# Patient Record
Sex: MALE | Race: WHITE | NOT HISPANIC OR LATINO | Employment: OTHER | URBAN - METROPOLITAN AREA
[De-identification: names, ages, dates, MRNs, and addresses within clinical notes are randomized per-mention and may not be internally consistent; named-entity substitution may affect disease eponyms.]

---

## 2023-07-19 NOTE — PROGRESS NOTES
"Subjective   Patient ID: Dixon Pino is a 63 y.o. male who presents for New Patient Visit and Medicare Annual Wellness Visit Initial. His wife is with him today.     HPI   His last office visit was over 15 yrs ago. Patient has NKA and is not currently taking any daily medications.     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 He notes that he can see out of his right eye, but everything is distorted.     He mentions that from the knee down, on his left leg, \"everything is on fire.\" This occurs intermittently.     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 Rx in MED list prescribed by Dr. Patel.     Review of Systems  Except positives as noted in the CC & HPI      Constitutional: Denies fevers, chills, night sweats, fatigue, weight changes, change in appetite    Eyes: Denies blurry vision, double vision, distorted vision  ENT: Denies otalgia, trouble hearing, tinnitus, vertigo, nasal congestion, rhinorrhea, sore throat    Neck: Denies swelling, masses    Cardiovascular: Denies chest pain, palpitations, edema, orthopnea, syncope    Respiratory: Denies dyspnea, cough, wheezing, postural nocturnal dyspnea    Gastrointestinal: Denies abdominal pain, nausea, vomiting, diarrhea, constipation, melena, hematochezia    Genitourinary: Denies dysuria, hematuria, frequency, urgency    Musculoskeletal: Denies back pain, neck pain, arthralgias, myalgias    Integumentary: Denies skin lesions, rashes, masses    Neurological: Denies dizziness, headaches, confusion, limb weakness, paresthesias, syncope, convulsions    Psychiatric: Denies depression, anxiety, homicidal ideations, suicidal ideations, sleep disturbances    Endocrine: Denies polyphagia, polydipsia, polyuria, weakness, hair thinning, heat intolerance, cold intolerance, weight changes    Heme/Lymph: Denies easy bruising, easy bleeding, swollen glands    Objective   BP " "144/72 (BP Location: Right arm, Patient Position: Sitting)   Pulse 74   Temp 36.4 °C (97.6 °F) (Temporal)   Resp 16   Ht 1.676 m (5' 6\")   Wt 94.3 kg (208 lb)   SpO2 97%   BMI 33.57 kg/m²     Physical Exam  144/72 on recheck of BP in the right arm.     General Appearance - well-developed, well-nourished, 63 y.o., White male in no acute distress.     Skin - warm, pink and dry without rash or concerning lesions.     Mental Status - alert and oriented x 3. Normal mood and affect appropriate to mood.     Ears - TMs shiny and move well with insufflation. Ear canals contain mild cerumen bilaterally.     Neck - supple without lymphadenopathy. Carotid pulses are normal without bruits. Thyroid is normal in midline without nodules.     Chest - lungs are clear to auscultation without rales, rhonchi or wheezes.     Heart - regular, rate and rhythm without murmurs, rubs or gallops.    Abdomen - soft, obese, protuberant, nontender, nondistended. No masses, hepatomegaly or splenomegaly is noted. No rebound, rigidity or guarding is noted. Bowel sounds are normoactive. Diastasis recti noted.     Extremities - no cyanosis, clubbing or edema. Pedal pulses are 2+ normal at the dorsalis pedis and posterior pulses bilaterally.     Neurological - cranial nerves II through XII are grossly intact. Motor strength 5/5 at all fours.     Assessment/Plan   1. Healthcare maintenance  Comprehensive Metabolic Panel    Urinalysis with Reflex Microscopic      2. Encounter to establish care        3. Bilateral leg paresthesia  TSH with reflex to Free T4 if abnormal    Vitamin B12    Folate    Hemoglobin A1C    Follow Up In [facility] - PCP - Established      4. Acne rosacea  CBC and Auto Differential    Hemoglobin A1C    Follow Up In [facility] - PCP - Established      5. Class 1 obesity due to excess calories without serious comorbidity with body mass index (BMI) of 33.0 to 33.9 in adult        6. Lipid screening  Lipid " Panel      7. Screening PSA (prostate specific antigen)  Prostate Specific Antigen, Screen      8. Screening for malignant neoplasm of colon  Colonoscopy      9. Need for pneumococcal 20-valent conjugate vaccination  Pneumococcal conjugate vaccine, 20-valent, adult (PREVNAR 20)      Patient to continue current medications (with any exceptions as noted) and diet. Follow-up in 3 month(s) otherwise as needed.      Patient is to return for fasting CBC, CMP, lipid panel, PSA, B12, folate, TSH, A1c, UA labs at their convenience prior to his next appointment. Fasting is nothing to eat or drink except water or black coffee for 8-12 hours. Will call patient with results when available.     Patient was advised to limit their salt intake and to not add any extra salt to their food. Patient is to avoid pretzels, chips, lunch meats, canned soups, soda pop, ham, ugarte, hot dogs, etc.     Colonoscopy was ordered to screen for colorectal cancer.     Prevnar-20 vaccine(s) given in the office today.     Patient is to follow up with Dr. Patel as scheduled.     Consider EMG/NCV pending lab results.         Scribe Attestation  By signing my name below, I, Brooklyn Parnell, George   attest that this documentation has been prepared under the direction and in the presence of Ryan Valdez MD.

## 2023-07-20 ENCOUNTER — OFFICE VISIT (OUTPATIENT)
Dept: PRIMARY CARE | Facility: CLINIC | Age: 63
End: 2023-07-20
Payer: MEDICARE

## 2023-07-20 VITALS
HEIGHT: 66 IN | SYSTOLIC BLOOD PRESSURE: 144 MMHG | TEMPERATURE: 97.6 F | BODY MASS INDEX: 33.43 KG/M2 | HEART RATE: 74 BPM | DIASTOLIC BLOOD PRESSURE: 72 MMHG | WEIGHT: 208 LBS | OXYGEN SATURATION: 97 % | RESPIRATION RATE: 16 BRPM

## 2023-07-20 DIAGNOSIS — Z12.11 SCREENING FOR MALIGNANT NEOPLASM OF COLON: ICD-10-CM

## 2023-07-20 DIAGNOSIS — L71.9 ACNE ROSACEA: ICD-10-CM

## 2023-07-20 DIAGNOSIS — Z76.89 ENCOUNTER TO ESTABLISH CARE: ICD-10-CM

## 2023-07-20 DIAGNOSIS — Z12.5 SCREENING PSA (PROSTATE SPECIFIC ANTIGEN): ICD-10-CM

## 2023-07-20 DIAGNOSIS — Z23 NEED FOR PNEUMOCOCCAL 20-VALENT CONJUGATE VACCINATION: ICD-10-CM

## 2023-07-20 DIAGNOSIS — E66.09 CLASS 1 OBESITY DUE TO EXCESS CALORIES WITHOUT SERIOUS COMORBIDITY WITH BODY MASS INDEX (BMI) OF 33.0 TO 33.9 IN ADULT: ICD-10-CM

## 2023-07-20 DIAGNOSIS — R20.2 BILATERAL LEG PARESTHESIA: ICD-10-CM

## 2023-07-20 DIAGNOSIS — Z13.220 LIPID SCREENING: ICD-10-CM

## 2023-07-20 DIAGNOSIS — Z00.00 HEALTHCARE MAINTENANCE: Primary | ICD-10-CM

## 2023-07-20 PROBLEM — E66.811 CLASS 1 OBESITY DUE TO EXCESS CALORIES WITHOUT SERIOUS COMORBIDITY WITH BODY MASS INDEX (BMI) OF 33.0 TO 33.9 IN ADULT: Status: ACTIVE | Noted: 2023-07-20

## 2023-07-20 PROCEDURE — 1160F RVW MEDS BY RX/DR IN RCRD: CPT | Performed by: FAMILY MEDICINE

## 2023-07-20 PROCEDURE — 1159F MED LIST DOCD IN RCRD: CPT | Performed by: FAMILY MEDICINE

## 2023-07-20 PROCEDURE — 99204 OFFICE O/P NEW MOD 45 MIN: CPT | Performed by: FAMILY MEDICINE

## 2023-07-20 PROCEDURE — G0438 PPPS, INITIAL VISIT: HCPCS | Performed by: FAMILY MEDICINE

## 2023-07-20 PROCEDURE — G0009 ADMIN PNEUMOCOCCAL VACCINE: HCPCS | Performed by: FAMILY MEDICINE

## 2023-07-20 PROCEDURE — 90677 PCV20 VACCINE IM: CPT | Performed by: FAMILY MEDICINE

## 2023-07-20 PROCEDURE — 1170F FXNL STATUS ASSESSED: CPT | Performed by: FAMILY MEDICINE

## 2023-07-20 PROCEDURE — 1036F TOBACCO NON-USER: CPT | Performed by: FAMILY MEDICINE

## 2023-07-20 PROCEDURE — 3008F BODY MASS INDEX DOCD: CPT | Performed by: FAMILY MEDICINE

## 2023-07-20 RX ORDER — DOXYCYCLINE HYCLATE 50 MG/1
CAPSULE ORAL
COMMUNITY
Start: 2023-05-17 | End: 2024-05-20 | Stop reason: ALTCHOICE

## 2023-07-20 ASSESSMENT — ACTIVITIES OF DAILY LIVING (ADL)
TAKING_MEDICATION: INDEPENDENT
MANAGING_FINANCES: INDEPENDENT
DRESSING: INDEPENDENT
GROCERY_SHOPPING: INDEPENDENT
DOING_HOUSEWORK: INDEPENDENT
BATHING: INDEPENDENT

## 2023-07-20 ASSESSMENT — ENCOUNTER SYMPTOMS
LOSS OF SENSATION IN FEET: 0
DEPRESSION: 0
OCCASIONAL FEELINGS OF UNSTEADINESS: 0

## 2023-07-20 ASSESSMENT — PATIENT HEALTH QUESTIONNAIRE - PHQ9
SUM OF ALL RESPONSES TO PHQ9 QUESTIONS 1 AND 2: 0
2. FEELING DOWN, DEPRESSED OR HOPELESS: NOT AT ALL
1. LITTLE INTEREST OR PLEASURE IN DOING THINGS: NOT AT ALL

## 2023-07-20 NOTE — PATIENT INSTRUCTIONS
Patient to continue current medications (with any exceptions as noted) and diet. Follow-up in 3 month(s) otherwise as needed.      Patient is to return for fasting CBC, CMP, lipid panel, PSA, B12, folate, TSH, A1c, UA labs at their convenience prior to his next appointment. Fasting is nothing to eat or drink except water or black coffee for 8-12 hours. Will call patient with results when available.     Patient was advised to limit their salt intake and to not add any extra salt to their food. Patient is to avoid pretzels, chips, lunch meats, canned soups, soda pop, ham, ugarte, hot dogs, etc.     Colonoscopy was ordered to screen for colorectal cancer.     Prevnar-20 vaccine(s) given in the office today.     Patient is to follow up with Dr. Patel as scheduled.

## 2023-07-21 ENCOUNTER — LAB (OUTPATIENT)
Dept: LAB | Facility: LAB | Age: 63
End: 2023-07-21
Payer: MEDICARE

## 2023-07-21 DIAGNOSIS — Z13.220 LIPID SCREENING: ICD-10-CM

## 2023-07-21 DIAGNOSIS — R20.2 BILATERAL LEG PARESTHESIA: ICD-10-CM

## 2023-07-21 DIAGNOSIS — Z00.00 HEALTHCARE MAINTENANCE: ICD-10-CM

## 2023-07-21 DIAGNOSIS — Z12.5 SCREENING PSA (PROSTATE SPECIFIC ANTIGEN): ICD-10-CM

## 2023-07-21 DIAGNOSIS — L71.9 ACNE ROSACEA: ICD-10-CM

## 2023-07-21 LAB
ALANINE AMINOTRANSFERASE (SGPT) (U/L) IN SER/PLAS: 40 U/L (ref 10–52)
ALBUMIN (G/DL) IN SER/PLAS: 4.1 G/DL (ref 3.4–5)
ALKALINE PHOSPHATASE (U/L) IN SER/PLAS: 89 U/L (ref 33–136)
ANION GAP IN SER/PLAS: 11 MMOL/L (ref 10–20)
APPEARANCE, URINE: CLEAR
ASPARTATE AMINOTRANSFERASE (SGOT) (U/L) IN SER/PLAS: 20 U/L (ref 9–39)
BASOPHILS (10*3/UL) IN BLOOD BY AUTOMATED COUNT: 0.07 X10E9/L (ref 0–0.1)
BASOPHILS/100 LEUKOCYTES IN BLOOD BY AUTOMATED COUNT: 1.1 % (ref 0–2)
BILIRUBIN TOTAL (MG/DL) IN SER/PLAS: 0.4 MG/DL (ref 0–1.2)
BILIRUBIN, URINE: NEGATIVE
BLOOD, URINE: NEGATIVE
CALCIUM (MG/DL) IN SER/PLAS: 9.1 MG/DL (ref 8.6–10.3)
CARBON DIOXIDE, TOTAL (MMOL/L) IN SER/PLAS: 26 MMOL/L (ref 21–32)
CHLORIDE (MMOL/L) IN SER/PLAS: 105 MMOL/L (ref 98–107)
CHOLESTEROL (MG/DL) IN SER/PLAS: 188 MG/DL (ref 0–199)
CHOLESTEROL IN HDL (MG/DL) IN SER/PLAS: 40.9 MG/DL
CHOLESTEROL/HDL RATIO: 4.6
COBALAMIN (VITAMIN B12) (PG/ML) IN SER/PLAS: 304 PG/ML (ref 211–911)
COLOR, URINE: YELLOW
CREATININE (MG/DL) IN SER/PLAS: 0.9 MG/DL (ref 0.5–1.3)
EOSINOPHILS (10*3/UL) IN BLOOD BY AUTOMATED COUNT: 0.16 X10E9/L (ref 0–0.7)
EOSINOPHILS/100 LEUKOCYTES IN BLOOD BY AUTOMATED COUNT: 2.5 % (ref 0–6)
ERYTHROCYTE DISTRIBUTION WIDTH (RATIO) BY AUTOMATED COUNT: 13.7 % (ref 11.5–14.5)
ERYTHROCYTE MEAN CORPUSCULAR HEMOGLOBIN CONCENTRATION (G/DL) BY AUTOMATED: 32.8 G/DL (ref 32–36)
ERYTHROCYTE MEAN CORPUSCULAR VOLUME (FL) BY AUTOMATED COUNT: 97 FL (ref 80–100)
ERYTHROCYTES (10*6/UL) IN BLOOD BY AUTOMATED COUNT: 4.31 X10E12/L (ref 4.5–5.9)
ESTIMATED AVERAGE GLUCOSE FOR HBA1C: 160 MG/DL
FOLATE (NG/ML) IN SER/PLAS: 12.8 NG/ML
GFR MALE: >90 ML/MIN/1.73M2
GLUCOSE (MG/DL) IN SER/PLAS: 145 MG/DL (ref 74–99)
GLUCOSE, URINE: ABNORMAL MG/DL
HEMATOCRIT (%) IN BLOOD BY AUTOMATED COUNT: 41.8 % (ref 41–52)
HEMOGLOBIN (G/DL) IN BLOOD: 13.7 G/DL (ref 13.5–17.5)
HEMOGLOBIN A1C/HEMOGLOBIN TOTAL IN BLOOD: 7.2 %
IMMATURE GRANULOCYTES/100 LEUKOCYTES IN BLOOD BY AUTOMATED COUNT: 1 % (ref 0–0.9)
KETONES, URINE: NEGATIVE MG/DL
LDL: 122 MG/DL (ref 0–99)
LEUKOCYTE ESTERASE, URINE: NEGATIVE
LEUKOCYTES (10*3/UL) IN BLOOD BY AUTOMATED COUNT: 6.3 X10E9/L (ref 4.4–11.3)
LYMPHOCYTES (10*3/UL) IN BLOOD BY AUTOMATED COUNT: 1.99 X10E9/L (ref 1.2–4.8)
LYMPHOCYTES/100 LEUKOCYTES IN BLOOD BY AUTOMATED COUNT: 31.6 % (ref 13–44)
MONOCYTES (10*3/UL) IN BLOOD BY AUTOMATED COUNT: 0.53 X10E9/L (ref 0.1–1)
MONOCYTES/100 LEUKOCYTES IN BLOOD BY AUTOMATED COUNT: 8.4 % (ref 2–10)
NEUTROPHILS (10*3/UL) IN BLOOD BY AUTOMATED COUNT: 3.48 X10E9/L (ref 1.2–7.7)
NEUTROPHILS/100 LEUKOCYTES IN BLOOD BY AUTOMATED COUNT: 55.4 % (ref 40–80)
NITRITE, URINE: NEGATIVE
PH, URINE: 5 (ref 5–8)
PLATELETS (10*3/UL) IN BLOOD AUTOMATED COUNT: 242 X10E9/L (ref 150–450)
POTASSIUM (MMOL/L) IN SER/PLAS: 4.4 MMOL/L (ref 3.5–5.3)
PROSTATE SPECIFIC ANTIGEN,SCREEN: 0.54 NG/ML (ref 0–4)
PROTEIN TOTAL: 7.2 G/DL (ref 6.4–8.2)
PROTEIN, URINE: NEGATIVE MG/DL
SODIUM (MMOL/L) IN SER/PLAS: 138 MMOL/L (ref 136–145)
SPECIFIC GRAVITY, URINE: 1.02 (ref 1–1.03)
THYROTROPIN (MIU/L) IN SER/PLAS BY DETECTION LIMIT <= 0.05 MIU/L: 2.16 MIU/L (ref 0.44–3.98)
TRIGLYCERIDE (MG/DL) IN SER/PLAS: 128 MG/DL (ref 0–149)
UREA NITROGEN (MG/DL) IN SER/PLAS: 20 MG/DL (ref 6–23)
UROBILINOGEN, URINE: <2 MG/DL (ref 0–1.9)
VLDL: 26 MG/DL (ref 0–40)

## 2023-07-21 PROCEDURE — 80061 LIPID PANEL: CPT

## 2023-07-21 PROCEDURE — 36415 COLL VENOUS BLD VENIPUNCTURE: CPT

## 2023-07-21 PROCEDURE — 83036 HEMOGLOBIN GLYCOSYLATED A1C: CPT

## 2023-07-21 PROCEDURE — 82746 ASSAY OF FOLIC ACID SERUM: CPT

## 2023-07-21 PROCEDURE — G0103 PSA SCREENING: HCPCS

## 2023-07-21 PROCEDURE — 84443 ASSAY THYROID STIM HORMONE: CPT

## 2023-07-21 PROCEDURE — 85025 COMPLETE CBC W/AUTO DIFF WBC: CPT

## 2023-07-21 PROCEDURE — 80053 COMPREHEN METABOLIC PANEL: CPT

## 2023-07-21 PROCEDURE — 82607 VITAMIN B-12: CPT

## 2023-07-21 PROCEDURE — 81003 URINALYSIS AUTO W/O SCOPE: CPT

## 2023-07-23 NOTE — RESULT ENCOUNTER NOTE
I have reviewed the patient's plan of care and based on this review, the patient treatment plan has been updated. Problem: Discharge Planning  Goal: Discharge to home or other facility with appropriate resources  Outcome: Progressing  Flowsheets (Taken 8/19/2022 1051)  Discharge to home or other facility with appropriate resources: Identify barriers to discharge with patient and caregiver  Note: Working toward goal of discharge to home. Problem: Pain  Goal: Verbalizes/displays adequate comfort level or baseline comfort level  8/19/2022 1051 by Tato Page RN  Outcome: Progressing  Flowsheets (Taken 8/19/2022 0814)  Verbalizes/displays adequate comfort level or baseline comfort level: Encourage patient to monitor pain and request assistance  Note: Patient satisfied with pain control. 8/19/2022 0100 by Chris Steele RN  Outcome: Progressing     Problem: Skin/Tissue Integrity  Goal: Absence of new skin breakdown  Description: 1. Monitor for areas of redness and/or skin breakdown  2. Assess vascular access sites hourly  3. Every 4-6 hours minimum:  Change oxygen saturation probe site  4. Every 4-6 hours:  If on nasal continuous positive airway pressure, respiratory therapy assess nares and determine need for appliance change or resting period. 8/19/2022 1051 by Tato Page RN  Outcome: Progressing  Note: Skin remains clean dry and intact. 8/19/2022 0100 by Chris Steele RN  Outcome: Progressing     Problem: Safety - Adult  Goal: Free from fall injury  8/19/2022 1051 by Tato Page RN  Outcome: Progressing  Flowsheets (Taken 8/19/2022 0951)  Free From Fall Injury: Instruct family/caregiver on patient safety  Note: Patient verbalizes understanding of fall precautions, uses call light appropriately.    8/19/2022 0100 by Chris Steele RN  Outcome: Progressing     Problem: ABCDS Injury Assessment  Goal: Absence of physical injury  8/19/2022 1051 by Tato Page RN  Outcome: Please call the patient regarding his abnormal result.  Hemoglobin A1c is in fair range at 7.2 with a blood sugar of 145,.  Patient is to continue diabetic diet.  T.Chol 188, , HDL 41, . Follow low cholesterol, low fat diet and exercise as tolerated.  Vitamin B-12 level is low normal at 304.  Patient should take a vitamin B12 supplement OTC 1000 mcg p.o. daily.  Thyroid function studies are normal.  PSA is normal at 0.54.     Progressing  Flowsheets (Taken 8/19/2022 8514)  Absence of Physical Injury: Implement safety measures based on patient assessment  Note: Patient verbalizes understanding of fall precautions, uses call light appropriately.    8/19/2022 0100 by Mak Krause RN  Outcome: Progressing

## 2023-10-20 ENCOUNTER — OFFICE VISIT (OUTPATIENT)
Dept: PRIMARY CARE | Facility: CLINIC | Age: 63
End: 2023-10-20
Payer: MEDICARE

## 2023-10-20 VITALS
DIASTOLIC BLOOD PRESSURE: 66 MMHG | RESPIRATION RATE: 16 BRPM | OXYGEN SATURATION: 97 % | SYSTOLIC BLOOD PRESSURE: 142 MMHG | WEIGHT: 210 LBS | HEART RATE: 74 BPM | TEMPERATURE: 97.3 F | BODY MASS INDEX: 33.75 KG/M2 | HEIGHT: 66 IN

## 2023-10-20 DIAGNOSIS — E66.09 CLASS 1 OBESITY DUE TO EXCESS CALORIES WITHOUT SERIOUS COMORBIDITY WITH BODY MASS INDEX (BMI) OF 33.0 TO 33.9 IN ADULT: ICD-10-CM

## 2023-10-20 DIAGNOSIS — E11.9 TYPE 2 DIABETES MELLITUS WITHOUT COMPLICATION, WITHOUT LONG-TERM CURRENT USE OF INSULIN (MULTI): Primary | ICD-10-CM

## 2023-10-20 DIAGNOSIS — I10 BENIGN ESSENTIAL HTN: ICD-10-CM

## 2023-10-20 DIAGNOSIS — L71.9 ACNE ROSACEA: ICD-10-CM

## 2023-10-20 DIAGNOSIS — R20.2 BILATERAL LEG PARESTHESIA: ICD-10-CM

## 2023-10-20 PROCEDURE — 3078F DIAST BP <80 MM HG: CPT | Performed by: FAMILY MEDICINE

## 2023-10-20 PROCEDURE — 1160F RVW MEDS BY RX/DR IN RCRD: CPT | Performed by: FAMILY MEDICINE

## 2023-10-20 PROCEDURE — 1036F TOBACCO NON-USER: CPT | Performed by: FAMILY MEDICINE

## 2023-10-20 PROCEDURE — 1159F MED LIST DOCD IN RCRD: CPT | Performed by: FAMILY MEDICINE

## 2023-10-20 PROCEDURE — 3008F BODY MASS INDEX DOCD: CPT | Performed by: FAMILY MEDICINE

## 2023-10-20 PROCEDURE — 4010F ACE/ARB THERAPY RXD/TAKEN: CPT | Performed by: FAMILY MEDICINE

## 2023-10-20 PROCEDURE — 99214 OFFICE O/P EST MOD 30 MIN: CPT | Performed by: FAMILY MEDICINE

## 2023-10-20 PROCEDURE — 3051F HG A1C>EQUAL 7.0%<8.0%: CPT | Performed by: FAMILY MEDICINE

## 2023-10-20 PROCEDURE — 3077F SYST BP >= 140 MM HG: CPT | Performed by: FAMILY MEDICINE

## 2023-10-20 RX ORDER — KETOCONAZOLE 20 MG/ML
SHAMPOO, SUSPENSION TOPICAL
COMMUNITY
Start: 2023-09-06

## 2023-10-20 RX ORDER — LISINOPRIL 10 MG/1
10 TABLET ORAL DAILY
Qty: 90 TABLET | Refills: 3 | Status: SHIPPED | OUTPATIENT
Start: 2023-10-20 | End: 2024-02-26 | Stop reason: SDUPTHER

## 2023-10-20 RX ORDER — CLOBETASOL PROPIONATE 0.46 MG/ML
SOLUTION TOPICAL
COMMUNITY
Start: 2023-09-06

## 2023-10-20 ASSESSMENT — PATIENT HEALTH QUESTIONNAIRE - PHQ9
2. FEELING DOWN, DEPRESSED OR HOPELESS: NOT AT ALL
SUM OF ALL RESPONSES TO PHQ9 QUESTIONS 1 & 2: 0
1. LITTLE INTEREST OR PLEASURE IN DOING THINGS: NOT AT ALL

## 2023-10-20 ASSESSMENT — ENCOUNTER SYMPTOMS
OCCASIONAL FEELINGS OF UNSTEADINESS: 0
LOSS OF SENSATION IN FEET: 0
DEPRESSION: 0

## 2023-10-20 NOTE — PROGRESS NOTES
"Subjective   Patient ID: Dixon Pino is a 63 y.o. male who presents for Follow-up. I last saw the patient on 7/20/2023.     HPI   Patient has no refill requests. He notes that he is currently taking a B12 supplement.     Patient has labs to be discussed today.     He states that his legs are still bothering him.     Review of Systems  Except positives as noted in the CC & HPI      Constitutional: Denies fevers, chills, night sweats, fatigue, weight changes, change in appetite    Eyes: Denies blurry vision, double vision    ENT: Denies otalgia, trouble hearing, tinnitus, vertigo, nasal congestion, rhinorrhea, sore throat    Neck: Denies swelling, masses    Cardiovascular: Denies chest pain, palpitations, edema, orthopnea, syncope    Respiratory: Denies dyspnea, cough, wheezing, postural nocturnal dyspnea    Gastrointestinal: Denies abdominal pain, nausea, vomiting, diarrhea, constipation, melena, hematochezia    Genitourinary: Denies dysuria, hematuria, frequency, urgency    Musculoskeletal: Denies back pain, neck pain, arthralgias, myalgias    Integumentary: Denies skin lesions, rashes, masses    Neurological: Denies dizziness, headaches, confusion, limb weakness, syncope, convulsions    Psychiatric: Denies depression, anxiety, homicidal ideations, suicidal ideations, sleep disturbances    Endocrine: Denies polyphagia, polydipsia, polyuria, weakness, hair thinning, heat intolerance, cold intolerance, weight changes    Heme/Lymph: Denies easy bruising, easy bleeding, swollen glands    Objective   /66 (BP Location: Right arm, Patient Position: Sitting)   Pulse 74   Temp 36.3 °C (97.3 °F)   Resp 16   Ht 1.674 m (5' 5.91\")   Wt 95.3 kg (210 lb)   SpO2 97%   BMI 33.99 kg/m²     Physical Exam  142/66 on recheck of BP in the right arm.     General Appearance - well-developed, well-nourished, 63 y.o., White male in no acute distress. Truncal obesity.     Skin - warm, pink and dry without rash or concerning " lesions.     Mental Status - alert and oriented x 3. Normal mood and affect appropriate to mood.     Neck - supple without lymphadenopathy. Carotid pulses are normal without bruits. Thyroid is normal in midline without nodules.     Chest - lungs are clear to auscultation without rales, rhonchi or wheezes.     Heart - regular, rate and rhythm without murmurs, rubs or gallops.    Extremities - no cyanosis, clubbing or edema. Pedal pulses are 2+ normal at the dorsalis pedis and posterior pulses bilaterally.     Neurological - cranial nerves II through XII are grossly intact. Motor strength 5/5 at all fours.     Lab Results   Component Value Date    ALBUMIN 4.1 07/21/2023    ALT 40 07/21/2023    AST 20 07/21/2023    BUN 20 07/21/2023    CALCIUM 9.1 07/21/2023     07/21/2023    CHOL 188 07/21/2023    CO2 26 07/21/2023    CREATININE 0.90 07/21/2023    FOLATE 12.8 07/21/2023    GLUCOSE 145 (H) 07/21/2023    HDL 40.9 07/21/2023    HCT 41.8 07/21/2023    HGB 13.7 07/21/2023    HGBA1C 7.2 (A) 07/21/2023    K 4.4 07/21/2023    LDLF 122 (H) 07/21/2023     07/21/2023     07/21/2023    PSASCREEN 0.54 07/21/2023    TRIG 128 07/21/2023    TSH 2.16 07/21/2023    WBC 6.3 07/21/2023    [id number] 304 07/21/2023     Assessment/Plan   1. Type 2 diabetes mellitus without complication, without long-term current use of insulin (CMS/[facility])  Referral to Nutrition Services    Follow Up In [facility] - PCP - Established    Hemoglobin A1C      2. Benign essential HTN  lisinopril 10 mg tablet    Follow Up In [facility] - PCP - Established      3. Bilateral leg paresthesia  Follow Up In [facility] - PCP - Established    Follow Up In [facility] - PCP - Established      4. Acne rosacea  Follow Up In [facility] - PCP - Established      5. Class 1 obesity due to excess calories without serious comorbidity with body mass index (BMI) of 33.0 to 33.9 in adult  Follow Up In [facility] - PCP - Established      Patient will continue on a diabetic, low-cholesterol diet and weight reduction. Exercise as tolerated. He will continue medications as prescribed. Follow-up in 3 month(s) otherwise as needed.      Will obtain A1c prior to patient's next appointment. Will call patient with results when available.     Patient was started on:   Lisinopril 10 mg, TAKE 1 TAB BY MOUTH DAILY     Rx(s) sent to pharmacy.     Advised patient to reduce carbohydrates and sweets in his diet. Encouraged patient to watch his diet and get more exercise into his routine.     Refer patient to nutritionist for further evaluation and diabetic diet.     Discussed starting patient on diabetic medication, he would like to watch his diet and lose weight.     Consider EMG NCV if leg symptoms persist or worsen.     Patient declines flu vaccine.       Scribe Attestation  By signing my name below, IBrooklyn Scribe   attest that this documentation has been prepared under the direction and in the presence of Ryan Valdez MD.

## 2023-10-20 NOTE — PATIENT INSTRUCTIONS
Patient will continue on a diabetic, low-cholesterol diet and weight reduction. Exercise as tolerated. He will continue medications as prescribed. Follow-up in 3 month(s) otherwise as needed.      Will obtain A1c prior to patient's next appointment. Will call patient with results when available.     Patient was started on:   Lisinopril 10 mg, TAKE 1 TAB BY MOUTH DAILY     Rx(s) sent to pharmacy.     Advised patient to reduce carbohydrates and sweets in his diet. Encouraged patient to watch his diet and get more exercise into his routine.     Refer patient to nutritionist for further evaluation and diabetic diet.     Discussed starting patient on diabetic medication, he would like to watch his diet and lose weight.     Consider EMG NCV if leg symptoms persist or worsen.     Patient declines flu vaccine.

## 2023-11-01 ENCOUNTER — TELEPHONE (OUTPATIENT)
Dept: PRIMARY CARE | Facility: CLINIC | Age: 63
End: 2023-11-01

## 2023-11-01 ENCOUNTER — NUTRITION (OUTPATIENT)
Dept: NUTRITION | Facility: HOSPITAL | Age: 63
End: 2023-11-01
Payer: MEDICARE

## 2023-11-01 DIAGNOSIS — E11.9 TYPE 2 DIABETES MELLITUS WITHOUT COMPLICATION, WITHOUT LONG-TERM CURRENT USE OF INSULIN (MULTI): ICD-10-CM

## 2023-11-01 PROCEDURE — 97802 MEDICAL NUTRITION INDIV IN: CPT | Performed by: FAMILY MEDICINE

## 2023-11-01 NOTE — TELEPHONE ENCOUNTER
Selene Schreiber, dietician at [facility] met with Dixon Pino he is a patient of Dr. Valdez's. Her message:  Pt is not checking blood sugar at home and does not have meter - does Dr. Valdez want him to check blood sugar at home??      Please advise if patient should be checking blood sugar at home?

## 2023-11-01 NOTE — LETTER
November 1, 2023     Ryan Valdez MD  [address]  [place] [address]    Patient: Dixon Pino   YOB: 1960   Date of Visit: 11/1/2023       Dear Dr. Ryan Valdez MD:    Thank you for referring Dixon Pino to me for evaluation. Below are my notes for this consultation.  If you have questions, please do not hesitate to call me. I look forward to following your patient along with you.       Sincerely,     Selene Schreiber, RD, LD      CC: No Recipients  ______________________________________________________________________________________    Reason for Nutrition Visit:  Pt is a 63 y.o. male referred for   1. Type 2 diabetes mellitus without complication, without long-term current use of insulin (CMS/[facility])  Referral to Nutrition Services           Past Medical Hx:  Patient Active Problem List   Diagnosis   • Class 1 obesity due to excess calories without serious comorbidity with body mass index (BMI) of 33.0 to 33.9 in adult   • Acne rosacea   • Bilateral leg paresthesia   • Type 2 diabetes mellitus without complication, without long-term current use of insulin (CMS/[facility])   • Benign essential HTN        Weight change:    Significant Weight Change: No    Lab Results   Component Value Date    HGBA1C 7.2 (A) 07/21/2023    CHOL 188 07/21/2023    LDLF 122 (H) 07/21/2023    TRIG 128 07/21/2023        Food and Nutrition Hx:  Newly diagnosed with diabetes, not currently on medication, has not been instructed to check blood sugar - does not have a meter. Has been trying to watch sugar intake since becoming pre-diabetic but is not counting carbs at this time - pt states has cut out cakes/sweets.  Used to be a big eater at night, usually snacking in the evening after dinner.   Gained wt when he quite smoking - 4 years ago, had developed sweet craving.   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    24 Diet Recall:  Meal 1: coffee, peanut butter crackers  Meal 2: sandwich  Meal 3: fish dinner or chicken paerkash or spaghetti or chili or hamburger helper  Snacks: chips and dip, peanut butter pretzels, sandwich  Beverages: water with milo 1-2 16 oz bottles, diet pepsi 2 small bottles    Allergies: None  Intolerance: None  Appetite: Normal  Intake: >75%  GI Symptoms : None  Swallowing Difficulty: No problems with swallowing  Dentition : own    Types of Activities: Walking - has stopped since it got cold out  Duration: 30-60 minutes daily    Sleep duration/quality : 7+ hours and disrupted sleep      Supplements: Vitamin B12 daily    Food Preparation: Patient and Partner/Spouse  Cooking Skills/Barriers: None reported  Grocery Shopping: Patient and Partner/Spouse    Eating Out Type: Restaurant  4 times per week      Nutrition Focused Physical Exam:    Performed/Deferred: Deferred as pt visually appears well-nourished with no signs of malnutrition    Estimated Energy Needs:  Weight Loss Needs: 1800 kcal/day and MSJ: with sedentary lifestyle factor-500 for wt loss    Nutrition Diagnosis:    Diagnosis Statement 1:  Diagnosis Status: New  Diagnosis : Excessive carbohydrate intake related to  newly diagnosed T2DM  as evidenced by  pt interview and diet recall - not counting carbs, craving sweets, snacking in the evening on high carb foods    Nutrition Interventions:  Consistent Carbohydrate Diet, Decreased Carbohydrate Diet, and Increased Protein Diet  Nutrition Counseling: Motivational Interviewing  Coordination of Care: None  Instructed on consistent carbohydrate intake, blood sugar goals, exercise, proper portion sizes, label reading, and general healthful nutrition. Instructed on benefits of wt loss with diabetes and heart health. Both verbal and written education provided in the one-on-one setting. Pt verbalized understanding of information provided. All questions answered to pt satisfaction throughout visit.    Nutrition Goals:  Nutrition Goals : Adequate fluid intake: ~64 oz water daily  Carbohydrate consistency  Consistent meal/snack pattern  Decrease intake of added sugars  Initiate Exercise Regimen  Reduce Hgb A1c  Reduce Kcal Intake  Weight Loss    Nutrition Recommendations:  Via teach back method patient verbalized understanding of the following topics:  1) Pt will follow plate method for meal planning and portion control - 1/2 plate non-starchy vegetables, 1/4 plate carbs. 1/4 plate lean protein - 45-60 g carb per meal; 15-30 g carb snack  2) Pt will consume 3 meals with 1-2 snacks per day - do not skip meals  3) Pt will measure foods for accurate portions   4) Pt will incorporate regular physical activity including strength training as medically appropriate per AHA guidelines   5) Pt will ensure protein is present at each meal and snack     Educational Handouts:   carb counting, label reading, exercise, diabetes plate method, blood sugar goals     Readiness to Change : Good  Level of Understanding : Good  Anticipated Compliant : Good

## 2023-11-01 NOTE — PATIENT INSTRUCTIONS
1) Pt will follow plate method for meal planning and portion control - 1/2 plate non-starchy vegetables, 1/4 plate carbs. 1/4 plate lean protein - 45-60 g carb per meal; 15-30 g carb snack  2) Pt will consume 3 meals with 1-2 snacks per day - do not skip meals  3) Pt will measure foods for accurate portions   4) Pt will incorporate regular physical activity including strength training as medically appropriate per AHA guidelines   5) Pt will ensure protein is present at each meal and snack

## 2023-11-01 NOTE — PROGRESS NOTES
Reason for Nutrition Visit:  Pt is a 63 y.o. male referred for   1. Type 2 diabetes mellitus without complication, without long-term current use of insulin (CMS/[facility])  Referral to Nutrition Services           Past Medical Hx:  Patient Active Problem List   Diagnosis    Class 1 obesity due to excess calories without serious comorbidity with body mass index (BMI) of 33.0 to 33.9 in adult    Acne rosacea    Bilateral leg paresthesia    Type 2 diabetes mellitus without complication, without long-term current use of insulin (CMS/[facility])    Benign essential HTN        Weight change:    Significant Weight Change: No    Lab Results   Component Value Date    HGBA1C 7.2 (A) 07/21/2023    CHOL 188 07/21/2023    LDLF 122 (H) 07/21/2023    TRIG 128 07/21/2023        Food and Nutrition Hx:  Newly diagnosed with diabetes, not currently on medication, has not been instructed to check blood sugar - does not have a meter. Has been trying to watch sugar intake since becoming pre-diabetic but is not counting carbs at this time - pt states has cut out cakes/sweets.  Used to be a big eater at night, usually snacking in the evening after dinner.   Gained wt when he quite smoking - 4 years ago, had developed sweet craving.   Retired in April - used to have active job  Pt reports inconsistent meal pattern - will skip meals throughout the day some days or only eat once a day.    24 Diet Recall:  Meal 1: coffee, peanut butter crackers  Meal 2: sandwich  Meal 3: fish dinner or chicken paerkash or spaghetti or chili or hamburger helper  Snacks: chips and dip, peanut butter pretzels, sandwich  Beverages: water with milo 1-2 16 oz bottles, diet pepsi 2 small bottles    Allergies: None  Intolerance: None  Appetite: Normal  Intake: >75%  GI Symptoms : None  Swallowing Difficulty: No problems with swallowing  Dentition : own    Types of Activities: Walking - has stopped since it got cold out  Duration: 30-60 minutes daily    Sleep duration/quality : 7+  hours and disrupted sleep      Supplements: Vitamin B12 daily    Food Preparation: Patient and Partner/Spouse  Cooking Skills/Barriers: None reported  Grocery Shopping: Patient and Partner/Spouse    Eating Out Type: Restaurant  4 times per week      Nutrition Focused Physical Exam:    Performed/Deferred: Deferred as pt visually appears well-nourished with no signs of malnutrition    Estimated Energy Needs:  Weight Loss Needs: 1800 kcal/day and MSJ: with sedentary lifestyle factor-500 for wt loss    Nutrition Diagnosis:    Diagnosis Statement 1:  Diagnosis Status: New  Diagnosis : Excessive carbohydrate intake related to  newly diagnosed T2DM  as evidenced by  pt interview and diet recall - not counting carbs, craving sweets, snacking in the evening on high carb foods    Nutrition Interventions:  Consistent Carbohydrate Diet, Decreased Carbohydrate Diet, and Increased Protein Diet  Nutrition Counseling: Motivational Interviewing  Coordination of Care: None  Instructed on consistent carbohydrate intake, blood sugar goals, exercise, proper portion sizes, label reading, and general healthful nutrition. Instructed on benefits of wt loss with diabetes and heart health. Both verbal and written education provided in the one-on-one setting. Pt verbalized understanding of information provided. All questions answered to pt satisfaction throughout visit.   Nutrition Goals:  Nutrition Goals : Adequate fluid intake: ~64 oz water daily  Carbohydrate consistency  Consistent meal/snack pattern  Decrease intake of added sugars  Initiate Exercise Regimen  Reduce Hgb A1c  Reduce Kcal Intake  Weight Loss    Nutrition Recommendations:  Via teach back method patient verbalized understanding of the following topics:  1) Pt will follow plate method for meal planning and portion control - 1/2 plate non-starchy vegetables, 1/4 plate carbs. 1/4 plate lean protein - 45-60 g carb per meal; 15-30 g carb snack  2) Pt will consume 3 meals with 1-2  snacks per day - do not skip meals  3) Pt will measure foods for accurate portions   4) Pt will incorporate regular physical activity including strength training as medically appropriate per AHA guidelines   5) Pt will ensure protein is present at each meal and snack     Educational Handouts:   carb counting, label reading, exercise, diabetes plate method, blood sugar goals     Readiness to Change : Good  Level of Understanding : Good  Anticipated Compliant : Good

## 2024-01-18 ENCOUNTER — LAB (OUTPATIENT)
Dept: LAB | Facility: LAB | Age: 64
End: 2024-01-18
Payer: MEDICARE

## 2024-01-18 ENCOUNTER — APPOINTMENT (OUTPATIENT)
Dept: PRIMARY CARE | Facility: CLINIC | Age: 64
End: 2024-01-18
Payer: MEDICARE

## 2024-01-18 DIAGNOSIS — E11.9 TYPE 2 DIABETES MELLITUS WITHOUT COMPLICATION, WITHOUT LONG-TERM CURRENT USE OF INSULIN (MULTI): ICD-10-CM

## 2024-01-18 LAB
EST. AVERAGE GLUCOSE BLD GHB EST-MCNC: 154 MG/DL
HBA1C MFR BLD: 7 %

## 2024-01-18 PROCEDURE — 36415 COLL VENOUS BLD VENIPUNCTURE: CPT

## 2024-01-18 PROCEDURE — 83036 HEMOGLOBIN GLYCOSYLATED A1C: CPT

## 2024-01-22 ENCOUNTER — APPOINTMENT (OUTPATIENT)
Dept: NUTRITION | Facility: HOSPITAL | Age: 64
End: 2024-01-22
Payer: MEDICARE

## 2024-01-24 ENCOUNTER — OFFICE VISIT (OUTPATIENT)
Dept: PRIMARY CARE | Facility: CLINIC | Age: 64
End: 2024-01-24
Payer: MEDICARE

## 2024-01-24 VITALS
BODY MASS INDEX: 34.89 KG/M2 | DIASTOLIC BLOOD PRESSURE: 66 MMHG | SYSTOLIC BLOOD PRESSURE: 136 MMHG | WEIGHT: 209.4 LBS | OXYGEN SATURATION: 96 % | HEART RATE: 76 BPM | TEMPERATURE: 97.3 F | RESPIRATION RATE: 16 BRPM | HEIGHT: 65 IN

## 2024-01-24 DIAGNOSIS — Z87.891 H/O TOBACCO USE, PRESENTING HAZARDS TO HEALTH: ICD-10-CM

## 2024-01-24 DIAGNOSIS — E11.9 TYPE 2 DIABETES MELLITUS WITHOUT COMPLICATION, WITHOUT LONG-TERM CURRENT USE OF INSULIN (MULTI): ICD-10-CM

## 2024-01-24 DIAGNOSIS — E66.09 CLASS 1 OBESITY DUE TO EXCESS CALORIES WITHOUT SERIOUS COMORBIDITY WITH BODY MASS INDEX (BMI) OF 33.0 TO 33.9 IN ADULT: ICD-10-CM

## 2024-01-24 DIAGNOSIS — Z12.2 ENCOUNTER FOR SCREENING FOR LUNG CANCER: ICD-10-CM

## 2024-01-24 DIAGNOSIS — E11.42 DIABETIC POLYNEUROPATHY ASSOCIATED WITH TYPE 2 DIABETES MELLITUS (MULTI): ICD-10-CM

## 2024-01-24 DIAGNOSIS — Z23 NEED FOR INFLUENZA VACCINATION: ICD-10-CM

## 2024-01-24 DIAGNOSIS — Z13.6 SCREENING FOR AAA (ABDOMINAL AORTIC ANEURYSM): ICD-10-CM

## 2024-01-24 DIAGNOSIS — R20.2 BILATERAL LEG PARESTHESIA: ICD-10-CM

## 2024-01-24 DIAGNOSIS — I10 BENIGN ESSENTIAL HTN: Primary | ICD-10-CM

## 2024-01-24 PROCEDURE — 2028F FOOT EXAM PERFORMED: CPT | Performed by: FAMILY MEDICINE

## 2024-01-24 PROCEDURE — G0008 ADMIN INFLUENZA VIRUS VAC: HCPCS | Performed by: FAMILY MEDICINE

## 2024-01-24 PROCEDURE — 1036F TOBACCO NON-USER: CPT | Performed by: FAMILY MEDICINE

## 2024-01-24 PROCEDURE — 3008F BODY MASS INDEX DOCD: CPT | Performed by: FAMILY MEDICINE

## 2024-01-24 PROCEDURE — 3051F HG A1C>EQUAL 7.0%<8.0%: CPT | Performed by: FAMILY MEDICINE

## 2024-01-24 PROCEDURE — 93000 ELECTROCARDIOGRAM COMPLETE: CPT | Performed by: FAMILY MEDICINE

## 2024-01-24 PROCEDURE — 99214 OFFICE O/P EST MOD 30 MIN: CPT | Performed by: FAMILY MEDICINE

## 2024-01-24 PROCEDURE — 1160F RVW MEDS BY RX/DR IN RCRD: CPT | Performed by: FAMILY MEDICINE

## 2024-01-24 PROCEDURE — 4010F ACE/ARB THERAPY RXD/TAKEN: CPT | Performed by: FAMILY MEDICINE

## 2024-01-24 PROCEDURE — 90662 IIV NO PRSV INCREASED AG IM: CPT | Performed by: FAMILY MEDICINE

## 2024-01-24 PROCEDURE — 3075F SYST BP GE 130 - 139MM HG: CPT | Performed by: FAMILY MEDICINE

## 2024-01-24 PROCEDURE — 3078F DIAST BP <80 MM HG: CPT | Performed by: FAMILY MEDICINE

## 2024-01-24 ASSESSMENT — ENCOUNTER SYMPTOMS
DEPRESSION: 0
LOSS OF SENSATION IN FEET: 0
OCCASIONAL FEELINGS OF UNSTEADINESS: 0

## 2024-01-24 ASSESSMENT — PATIENT HEALTH QUESTIONNAIRE - PHQ9
1. LITTLE INTEREST OR PLEASURE IN DOING THINGS: NOT AT ALL
2. FEELING DOWN, DEPRESSED OR HOPELESS: NOT AT ALL
SUM OF ALL RESPONSES TO PHQ9 QUESTIONS 1 & 2: 0

## 2024-01-24 NOTE — PATIENT INSTRUCTIONS
Patient will continue on a diabetic, low-cholesterol diet and weight reduction. Exercise as tolerated. He will continue medications as prescribed. Follow-up in 1 month(s) otherwise as needed.      Will obtain IO EKG today.     High-dose flu vaccine given in the office today.     Ordered EMG/NCV in bilateral LEs. Will call patient with results when available.     Ordered abdominal aortic aneurysm screening. Will call patient with results when available.      Ordered CT for lung cancer screening. Will call patient with results when available.

## 2024-01-24 NOTE — PROGRESS NOTES
"Subjective   Patient ID: Dixon Pino is a 63 y.o. male who presents for Follow-up, Diabetes, and Hypertension. I last saw the patient on 10/20/2023.     HPI   Pt states the burning in legs and feet keeping him awake at night. He believes it has somewhat worsened since LOV. He notes that the burning does not bother him as much during the day, mainly at night.     Discuss blood work and BP medication.     Family history of heart problems.     Review of Systems  Except positives as noted in the CC & HPI      Constitutional: Denies fevers, chills, night sweats, fatigue, weight changes, change in appetite    Eyes: Denies blurry vision, double vision    ENT: Denies otalgia, trouble hearing, tinnitus, vertigo, nasal congestion, rhinorrhea, sore throat    Neck: Denies swelling, masses    Cardiovascular: Denies chest pain, palpitations, edema, orthopnea, syncope    Respiratory: Denies dyspnea, cough, wheezing, postural nocturnal dyspnea    Gastrointestinal: Denies abdominal pain, nausea, vomiting, diarrhea, constipation, melena, hematochezia    Genitourinary: Denies dysuria, hematuria, frequency, urgency    Musculoskeletal: Denies back pain, neck pain, arthralgias, myalgias    Integumentary: Denies skin lesions, rashes, masses    Neurological: Denies dizziness, headaches, confusion, limb weakness, syncope, convulsions    Psychiatric: Denies depression, anxiety, homicidal ideations, suicidal ideations  Endocrine: Denies polyphagia, polydipsia, polyuria, weakness, hair thinning, heat intolerance, cold intolerance, weight changes    Heme/Lymph: Denies easy bruising, easy bleeding, swollen glands    Objective   /66 (BP Location: Right arm, Patient Position: Sitting)   Pulse 76   Temp 36.3 °C (97.3 °F)   Resp 16   Ht 1.651 m (5' 5\")   Wt 95 kg (209 lb 6.4 oz)   SpO2 96%   BMI 34.85 kg/m²     Physical Exam  Feet:      Right foot:      Protective Sensation: 10 sites tested.  5 sites sensed.      Left foot:      " Protective Sensation: 10 sites tested.  3 sites sensed.      Comments: Diabetic foot exam done today. Callus on the bilateral medial great toes.   # 6, 8, and 10 were sensed on the left foot.   # 2, 3, 5, 6, and 10 were sensed on the right foot.      136/66 on recheck of BP in the right arm.     General Appearance - well-developed, well-nourished, 63 y.o., White male in no acute distress.     Skin - warm, pink and dry without rash or concerning lesions.     Mental Status - alert and oriented x 3. Normal mood and affect appropriate to mood.     Neck - supple without lymphadenopathy. Carotid pulses are normal without bruits. Thyroid is normal in midline without nodules.     Chest - lungs are clear to auscultation without rales, rhonchi or wheezes.     Heart - regular, rate and rhythm without murmurs, rubs or gallops.    Abdomen - soft, obese, protuberant, nontender, nondistended. No masses, hepatomegaly or splenomegaly is noted. No rebound, rigidity or guarding is noted. Bowel sounds are normoactive. Diastasis recti noted.     Extremities - no cyanosis, clubbing or edema. Pedal pulses are 2+ normal at the dorsalis pedis and posterior pulses bilaterally.     Neurological - cranial nerves II through XII are grossly intact. Motor strength 5/5 at all fours.     Lab Results   Component Value Date    HGBA1C 7.0 (H) 01/18/2024     Assessment/Plan   1. Benign essential HTN  Follow Up In [facility] - PCP - Established    ECG 12 Lead    ECG 12 lead (Ancillary Performed)      2. Type 2 diabetes mellitus without complication, without long-term current use of insulin (CMS/[facility])  Follow Up In [facility] - PCP - Established     Diabetes Foot Exam    ECG 12 Lead    Follow Up In [facility] - PCP - Established      3. Bilateral leg paresthesia  Follow Up In [facility] - PCP - Established    EMG & nerve conduction      4. Diabetic polyneuropathy associated with type 2 diabetes mellitus  (CMS/[facility])  Follow Up In [facility] - PCP - Established      5. Class 1 obesity due to excess calories without serious comorbidity with body mass index (BMI) of 33.0 to 33.9 in adult  Follow Up In [facility] - PCP - Established      6. Need for influenza vaccination  Flu vaccine, quadrivalent, high-dose, preservative free, age 65y+ (FLUZONE)      7. Screening for AAA (abdominal aortic aneurysm)  Vascular US abdominal aorta anuerysm AAA screening      8. Encounter for screening for lung cancer  CT lung screening low dose      9. H/O tobacco use, presenting hazards to health  CT lung screening low dose      Patient will continue on a diabetic, low-cholesterol diet and weight reduction. Exercise as tolerated. He will continue medications as prescribed. Follow-up in 1 month(s) otherwise as needed.      Will obtain IO EKG today. Unable to obtain do to malfunctioning EKG machine.    High-dose flu vaccine given in the office today.     Ordered EMG/NCV in bilateral LEs. Will call patient with results when available.     Ordered abdominal aortic aneurysm screening. Will call patient with results when available.      Ordered CT for lung cancer screening. Will call patient with results when available.       Scribe Attestation  By signing my name below, IBrooklyn Scribe   attest that this documentation has been prepared under the direction and in the presence of Ryan Valdez MD.

## 2024-01-25 ENCOUNTER — TELEPHONE (OUTPATIENT)
Dept: PRIMARY CARE | Facility: CLINIC | Age: 64
End: 2024-01-25
Payer: MEDICARE

## 2024-02-01 ENCOUNTER — HOSPITAL ENCOUNTER (OUTPATIENT)
Dept: CARDIOLOGY | Facility: HOSPITAL | Age: 64
Discharge: HOME | End: 2024-02-01
Payer: MEDICARE

## 2024-02-01 DIAGNOSIS — I10 BENIGN ESSENTIAL HTN: ICD-10-CM

## 2024-02-01 PROCEDURE — 93010 ELECTROCARDIOGRAM REPORT: CPT | Performed by: INTERNAL MEDICINE

## 2024-02-01 PROCEDURE — 93005 ELECTROCARDIOGRAM TRACING: CPT

## 2024-02-02 LAB
ATRIAL RATE: 73 BPM
P AXIS: 60 DEGREES
P OFFSET: 201 MS
P ONSET: 149 MS
PR INTERVAL: 148 MS
Q ONSET: 223 MS
QRS COUNT: 12 BEATS
QRS DURATION: 86 MS
QT INTERVAL: 400 MS
QTC CALCULATION(BAZETT): 440 MS
QTC FREDERICIA: 427 MS
R AXIS: 67 DEGREES
T AXIS: 46 DEGREES
T OFFSET: 423 MS
VENTRICULAR RATE: 73 BPM

## 2024-02-07 ENCOUNTER — HOSPITAL ENCOUNTER (OUTPATIENT)
Dept: RADIOLOGY | Facility: CLINIC | Age: 64
Discharge: HOME | End: 2024-02-07
Payer: MEDICARE

## 2024-02-07 DIAGNOSIS — Z87.891 H/O TOBACCO USE, PRESENTING HAZARDS TO HEALTH: ICD-10-CM

## 2024-02-07 DIAGNOSIS — Z13.6 SCREENING FOR AAA (ABDOMINAL AORTIC ANEURYSM): ICD-10-CM

## 2024-02-07 DIAGNOSIS — Z12.2 ENCOUNTER FOR SCREENING FOR LUNG CANCER: ICD-10-CM

## 2024-02-07 PROCEDURE — 76706 US ABDL AORTA SCREEN AAA: CPT

## 2024-02-07 PROCEDURE — 76706 US ABDL AORTA SCREEN AAA: CPT | Performed by: RADIOLOGY

## 2024-02-07 PROCEDURE — 71271 CT THORAX LUNG CANCER SCR C-: CPT

## 2024-02-11 DIAGNOSIS — N28.9 RENAL LESION: ICD-10-CM

## 2024-02-11 DIAGNOSIS — N28.9 RENAL LESION: Primary | ICD-10-CM

## 2024-02-11 DIAGNOSIS — R91.8 MULTIPLE LUNG NODULES: Primary | ICD-10-CM

## 2024-02-11 NOTE — RESULT ENCOUNTER NOTE
Please call the patient regarding his abnormal result.  CT of the chest reveals bilateral noncalcified pulmonary nodules measuring up to 6 mm.  Recommend repeat CT of the chest in 6 months to ensure stability.  2.1 cm lesion in the superior pole of the left kidney.  Recommend renal ultrasound for further evaluation.  Order placed.

## 2024-02-12 ENCOUNTER — TELEPHONE (OUTPATIENT)
Dept: PRIMARY CARE | Facility: CLINIC | Age: 64
End: 2024-02-12
Payer: MEDICARE

## 2024-02-13 ENCOUNTER — HOSPITAL ENCOUNTER (OUTPATIENT)
Dept: RADIOLOGY | Facility: HOSPITAL | Age: 64
Discharge: HOME | End: 2024-02-13
Payer: MEDICARE

## 2024-02-13 DIAGNOSIS — N28.9 RENAL LESION: ICD-10-CM

## 2024-02-13 PROCEDURE — 76770 US EXAM ABDO BACK WALL COMP: CPT

## 2024-02-13 PROCEDURE — 76770 US EXAM ABDO BACK WALL COMP: CPT | Performed by: RADIOLOGY

## 2024-02-18 DIAGNOSIS — N28.9 RENAL LESION: Primary | ICD-10-CM

## 2024-02-18 NOTE — RESULT ENCOUNTER NOTE
Please call the patient regarding his abnormal result.  Renal ultrasound reveals a 2.3 cm diameter lesion within the upper pole of the left kidney secondary to acoustic shadowing.  Recommend MRI of the kidney for further evaluation.  Bladder diverticulum measuring up to 3.4 cm in size.  It could be a source of recurrent infection.

## 2024-02-26 ENCOUNTER — OFFICE VISIT (OUTPATIENT)
Dept: PRIMARY CARE | Facility: CLINIC | Age: 64
End: 2024-02-26
Payer: MEDICARE

## 2024-02-26 VITALS
RESPIRATION RATE: 16 BRPM | BODY MASS INDEX: 35.06 KG/M2 | SYSTOLIC BLOOD PRESSURE: 144 MMHG | WEIGHT: 210.4 LBS | DIASTOLIC BLOOD PRESSURE: 62 MMHG | TEMPERATURE: 97.6 F | OXYGEN SATURATION: 99 % | HEIGHT: 65 IN | HEART RATE: 72 BPM

## 2024-02-26 DIAGNOSIS — E11.9 TYPE 2 DIABETES MELLITUS WITHOUT COMPLICATION, WITHOUT LONG-TERM CURRENT USE OF INSULIN (MULTI): Primary | ICD-10-CM

## 2024-02-26 DIAGNOSIS — N28.9 RENAL LESION: ICD-10-CM

## 2024-02-26 DIAGNOSIS — E11.42 DIABETIC POLYNEUROPATHY ASSOCIATED WITH TYPE 2 DIABETES MELLITUS (MULTI): ICD-10-CM

## 2024-02-26 DIAGNOSIS — E66.01 OBESITY, MORBID (MULTI): ICD-10-CM

## 2024-02-26 DIAGNOSIS — E66.09 CLASS 2 OBESITY DUE TO EXCESS CALORIES WITHOUT SERIOUS COMORBIDITY WITH BODY MASS INDEX (BMI) OF 35.0 TO 35.9 IN ADULT: ICD-10-CM

## 2024-02-26 DIAGNOSIS — I10 BENIGN ESSENTIAL HTN: ICD-10-CM

## 2024-02-26 PROBLEM — E66.812 CLASS 2 OBESITY DUE TO EXCESS CALORIES WITHOUT SERIOUS COMORBIDITY WITH BODY MASS INDEX (BMI) OF 35.0 TO 35.9 IN ADULT: Status: ACTIVE | Noted: 2023-07-20

## 2024-02-26 PROCEDURE — 3051F HG A1C>EQUAL 7.0%<8.0%: CPT | Performed by: FAMILY MEDICINE

## 2024-02-26 PROCEDURE — 4010F ACE/ARB THERAPY RXD/TAKEN: CPT | Performed by: FAMILY MEDICINE

## 2024-02-26 PROCEDURE — 3078F DIAST BP <80 MM HG: CPT | Performed by: FAMILY MEDICINE

## 2024-02-26 PROCEDURE — 99214 OFFICE O/P EST MOD 30 MIN: CPT | Performed by: FAMILY MEDICINE

## 2024-02-26 PROCEDURE — 2028F FOOT EXAM PERFORMED: CPT | Performed by: FAMILY MEDICINE

## 2024-02-26 PROCEDURE — 1036F TOBACCO NON-USER: CPT | Performed by: FAMILY MEDICINE

## 2024-02-26 PROCEDURE — 1159F MED LIST DOCD IN RCRD: CPT | Performed by: FAMILY MEDICINE

## 2024-02-26 PROCEDURE — 3008F BODY MASS INDEX DOCD: CPT | Performed by: FAMILY MEDICINE

## 2024-02-26 PROCEDURE — 3077F SYST BP >= 140 MM HG: CPT | Performed by: FAMILY MEDICINE

## 2024-02-26 RX ORDER — METFORMIN HYDROCHLORIDE 500 MG/1
500 TABLET, EXTENDED RELEASE ORAL
Qty: 90 TABLET | Refills: 3 | Status: SHIPPED | OUTPATIENT
Start: 2024-02-26 | End: 2025-02-25

## 2024-02-26 RX ORDER — LISINOPRIL 20 MG/1
20 TABLET ORAL DAILY
Qty: 90 TABLET | Refills: 3 | Status: SHIPPED | OUTPATIENT
Start: 2024-02-26 | End: 2025-02-25

## 2024-02-26 ASSESSMENT — ENCOUNTER SYMPTOMS
OCCASIONAL FEELINGS OF UNSTEADINESS: 0
DEPRESSION: 0
LOSS OF SENSATION IN FEET: 0

## 2024-02-26 ASSESSMENT — PATIENT HEALTH QUESTIONNAIRE - PHQ9
1. LITTLE INTEREST OR PLEASURE IN DOING THINGS: NOT AT ALL
SUM OF ALL RESPONSES TO PHQ9 QUESTIONS 1 & 2: 0
2. FEELING DOWN, DEPRESSED OR HOPELESS: NOT AT ALL

## 2024-02-26 NOTE — PATIENT INSTRUCTIONS
Patient will continue on a diabetic, low-cholesterol diet and weight reduction. Exercise as tolerated. He will continue medications as prescribed. Follow-up in 3 month(s) otherwise as needed.      Will obtain A1c prior to patient's next appointment. Will call patient with results when available.     Increase Lisinopril to 20 mg.     Patient was started on:   Metformin HCl  mg, TAKE 1 TAB BY MOUTH DAILY WITH FOOD    Patient was given refill(s) on:   Lisinopril 20 mg, TAKE 1 TAB BY MOUTH DAILY     Rx(s) sent to pharmacy.

## 2024-02-26 NOTE — PROGRESS NOTES
"Subjective   Patient ID: Dixon Pino is a 63 y.o. male who presents for Follow-up. I last saw the patient on 1/24/2024.     HPI   Patient has labs to be discussed today.     Review of Systems  Except positives as noted in the CC & HPI      Constitutional: Denies fevers, chills, night sweats, fatigue, weight changes, change in appetite    Eyes: Denies blurry vision, double vision    ENT: Denies otalgia, trouble hearing, tinnitus, vertigo, nasal congestion, rhinorrhea, sore throat    Neck: Denies swelling, masses    Cardiovascular: Denies chest pain, palpitations, edema, orthopnea, syncope    Respiratory: Denies dyspnea, cough, wheezing, postural nocturnal dyspnea    Gastrointestinal: Denies abdominal pain, nausea, vomiting, diarrhea, constipation, melena, hematochezia    Genitourinary: Denies dysuria, hematuria, frequency, urgency    Musculoskeletal: Denies back pain, neck pain, arthralgias, myalgias    Integumentary: Denies skin lesions, rashes, masses    Neurological: Denies dizziness, headaches, confusion, limb weakness, paresthesias, syncope, convulsions    Psychiatric: Denies depression, anxiety, homicidal ideations, suicidal ideations, sleep disturbances    Endocrine: Denies polyphagia, polydipsia, polyuria, weakness, hair thinning, heat intolerance, cold intolerance, weight changes    Heme/Lymph: Denies easy bruising, easy bleeding, swollen glands     Objective   /62 (BP Location: Right arm, Patient Position: Sitting)   Pulse 72   Temp 36.4 °C (97.6 °F)   Resp 16   Ht 1.651 m (5' 5\")   Wt 95.4 kg (210 lb 6.4 oz)   SpO2 99%   BMI 35.01 kg/m²     Physical Exam  144/62 on recheck of BP in the right arm.     General Appearance - well-developed, well-nourished, 63 y.o., White male in no acute distress.     Skin - warm, pink and dry without rash or concerning lesions.     Mental Status - alert and oriented x 3. Normal mood and affect appropriate to mood.     Neck - supple without lymphadenopathy. " Carotid pulses are normal without bruits. Thyroid is normal in midline without nodules.     Chest - lungs are clear to auscultation without rales, rhonchi or wheezes.     Heart - regular, rate and rhythm without murmurs, rubs or gallops.    Abdomen - soft, obese, protuberant, nontender, nondistended. No masses, hepatomegaly or splenomegaly is noted. No rebound, rigidity or guarding is noted. Bowel sounds are normoactive. Diastasis recti noted.     Extremities - no cyanosis, clubbing or edema. Pedal pulses are 2+ normal at the dorsalis pedis and posterior pulses bilaterally.     Neurological - cranial nerves II through XII are grossly intact. Motor strength 5/5 at all fours.     Lab Results   Component Value Date    HGBA1C 7.0 (H) 01/18/2024     Results  Reviewed lung CT, renal US, abdominal aortic US results from 2/7/2024 and 2/13/2024.      Assessment/Plan   1. Type 2 diabetes mellitus without complication, without long-term current use of insulin (CMS/HCC)  metFORMIN XR (Glucophage-XR) 500 mg 24 hr tablet      2. Benign essential HTN  lisinopril 20 mg tablet      3. Diabetic polyneuropathy associated with type 2 diabetes mellitus (CMS/HCC)        4. Renal lesion        5. Class 2 obesity due to excess calories without serious comorbidity with body mass index (BMI) of 35.0 to 35.9 in adult        6. Obesity, morbid (CMS/HCC)        Patient will continue on a diabetic, low-cholesterol diet and weight reduction. Exercise as tolerated. He will continue medications as prescribed. Follow-up in 3 month(s) otherwise as needed.      Will obtain A1c prior to patient's next appointment. Will call patient with results when available.     Increase Lisinopril to 20 mg.     Patient was started on:   Metformin HCl  mg, TAKE 1 TAB BY MOUTH DAILY WITH FOOD    Patient was given refill(s) on:   Lisinopril 20 mg, TAKE 1 TAB BY MOUTH DAILY     Rx(s) sent to pharmacy.         Scribe Attestation  By signing my name below, Brooklyn VELASQUEZ  George Parnell   attest that this documentation has been prepared under the direction and in the presence of Ryan Valdez MD.

## 2024-02-27 NOTE — ASSESSMENT & PLAN NOTE
Obesity is stable.  Patient to continue with current medications and treatment plan.  Follow-up at least annually.  Patient was encouraged to watch his diet, exercise and lose weight.

## 2024-03-04 ENCOUNTER — HOSPITAL ENCOUNTER (OUTPATIENT)
Dept: NEUROLOGY | Facility: HOSPITAL | Age: 64
Discharge: HOME | End: 2024-03-04
Payer: MEDICARE

## 2024-03-04 DIAGNOSIS — R20.2 BILATERAL LEG PARESTHESIA: ICD-10-CM

## 2024-03-04 PROCEDURE — 95910 NRV CNDJ TEST 7-8 STUDIES: CPT

## 2024-03-05 ENCOUNTER — HOSPITAL ENCOUNTER (OUTPATIENT)
Dept: RADIOLOGY | Facility: HOSPITAL | Age: 64
Discharge: HOME | End: 2024-03-05
Payer: MEDICARE

## 2024-03-05 DIAGNOSIS — N28.9 RENAL LESION: ICD-10-CM

## 2024-03-05 PROCEDURE — 74183 MRI ABD W/O CNTR FLWD CNTR: CPT

## 2024-03-05 PROCEDURE — 74183 MRI ABD W/O CNTR FLWD CNTR: CPT | Performed by: RADIOLOGY

## 2024-03-05 PROCEDURE — A9575 INJ GADOTERATE MEGLUMI 0.1ML: HCPCS | Performed by: FAMILY MEDICINE

## 2024-03-05 PROCEDURE — 2550000001 HC RX 255 CONTRASTS: Performed by: FAMILY MEDICINE

## 2024-03-05 RX ORDER — GADOTERATE MEGLUMINE 376.9 MG/ML
0.2 INJECTION INTRAVENOUS
Status: COMPLETED | OUTPATIENT
Start: 2024-03-05 | End: 2024-03-05

## 2024-03-05 RX ADMIN — GADOTERATE MEGLUMINE 19 ML: 376.9 INJECTION INTRAVENOUS at 15:28

## 2024-03-10 NOTE — RESULT ENCOUNTER NOTE
Please call the patient regarding his abnormal result.  EMG/NCV of the lower extremities reveals evidence of peripheral neuropathy likely due to the patient's diabetes mellitus type 2.  Patient is to continue current medications with diabetic diet and exercise.  Follow-up as scheduled.

## 2024-05-20 ENCOUNTER — LAB (OUTPATIENT)
Dept: LAB | Facility: LAB | Age: 64
End: 2024-05-20
Payer: MEDICARE

## 2024-05-20 ENCOUNTER — OFFICE VISIT (OUTPATIENT)
Dept: PRIMARY CARE | Facility: CLINIC | Age: 64
End: 2024-05-20
Payer: MEDICARE

## 2024-05-20 VITALS
TEMPERATURE: 97.4 F | HEART RATE: 74 BPM | RESPIRATION RATE: 16 BRPM | SYSTOLIC BLOOD PRESSURE: 122 MMHG | DIASTOLIC BLOOD PRESSURE: 76 MMHG | HEIGHT: 65 IN | WEIGHT: 204 LBS | BODY MASS INDEX: 33.99 KG/M2 | OXYGEN SATURATION: 97 %

## 2024-05-20 DIAGNOSIS — E11.9 TYPE 2 DIABETES MELLITUS WITHOUT COMPLICATION, WITHOUT LONG-TERM CURRENT USE OF INSULIN (MULTI): Primary | ICD-10-CM

## 2024-05-20 DIAGNOSIS — Z11.59 NEED FOR HEPATITIS C SCREENING TEST: ICD-10-CM

## 2024-05-20 DIAGNOSIS — E11.9 TYPE 2 DIABETES MELLITUS WITHOUT COMPLICATION, WITHOUT LONG-TERM CURRENT USE OF INSULIN (MULTI): ICD-10-CM

## 2024-05-20 DIAGNOSIS — E66.09 CLASS 1 OBESITY DUE TO EXCESS CALORIES WITHOUT SERIOUS COMORBIDITY WITH BODY MASS INDEX (BMI) OF 33.0 TO 33.9 IN ADULT: ICD-10-CM

## 2024-05-20 DIAGNOSIS — E11.42 DIABETIC POLYNEUROPATHY ASSOCIATED WITH TYPE 2 DIABETES MELLITUS (MULTI): ICD-10-CM

## 2024-05-20 DIAGNOSIS — I10 BENIGN ESSENTIAL HTN: ICD-10-CM

## 2024-05-20 DIAGNOSIS — Z13.220 LIPID SCREENING: ICD-10-CM

## 2024-05-20 DIAGNOSIS — Z12.5 SCREENING PSA (PROSTATE SPECIFIC ANTIGEN): ICD-10-CM

## 2024-05-20 PROBLEM — E66.01 OBESITY, MORBID (MULTI): Status: RESOLVED | Noted: 2024-02-26 | Resolved: 2024-05-20

## 2024-05-20 LAB
EST. AVERAGE GLUCOSE BLD GHB EST-MCNC: 140 MG/DL
HBA1C MFR BLD: 6.5 %

## 2024-05-20 PROCEDURE — 3078F DIAST BP <80 MM HG: CPT | Performed by: FAMILY MEDICINE

## 2024-05-20 PROCEDURE — 3008F BODY MASS INDEX DOCD: CPT | Performed by: FAMILY MEDICINE

## 2024-05-20 PROCEDURE — 3074F SYST BP LT 130 MM HG: CPT | Performed by: FAMILY MEDICINE

## 2024-05-20 PROCEDURE — 99213 OFFICE O/P EST LOW 20 MIN: CPT | Performed by: FAMILY MEDICINE

## 2024-05-20 PROCEDURE — 2028F FOOT EXAM PERFORMED: CPT | Performed by: FAMILY MEDICINE

## 2024-05-20 PROCEDURE — 83036 HEMOGLOBIN GLYCOSYLATED A1C: CPT

## 2024-05-20 PROCEDURE — 4010F ACE/ARB THERAPY RXD/TAKEN: CPT | Performed by: FAMILY MEDICINE

## 2024-05-20 PROCEDURE — 36415 COLL VENOUS BLD VENIPUNCTURE: CPT

## 2024-05-20 PROCEDURE — 1036F TOBACCO NON-USER: CPT | Performed by: FAMILY MEDICINE

## 2024-05-20 PROCEDURE — 3044F HG A1C LEVEL LT 7.0%: CPT | Performed by: FAMILY MEDICINE

## 2024-05-20 PROCEDURE — 1159F MED LIST DOCD IN RCRD: CPT | Performed by: FAMILY MEDICINE

## 2024-05-20 ASSESSMENT — PATIENT HEALTH QUESTIONNAIRE - PHQ9
1. LITTLE INTEREST OR PLEASURE IN DOING THINGS: NOT AT ALL
SUM OF ALL RESPONSES TO PHQ9 QUESTIONS 1 AND 2: 0
2. FEELING DOWN, DEPRESSED OR HOPELESS: NOT AT ALL

## 2024-05-20 NOTE — PROGRESS NOTES
"Chief Complaint   Patient presents with    Follow-up     HPI: Dixon Pino is a 64 y.o. male presenting for follow-up of Follow-up  . I last saw the patient 2/26/2024.     Patient is here for follow up, taking medications as directed, no medial concerns for rooming MA.     Patient is not checking his blood sugars at home.    ROS  Except positives as noted in the CC & HPI   Constitutional: Denies fevers, chills, night sweats, fatigue, weight changes, change in appetite   Eyes: Denies blurry vision, double vision   ENT: Denies otalgia, trouble hearing, tinnitus, vertigo, nasal congestion, rhinorrhea, sore throat   Neck: Denies swelling, masses   Cardiovascular: Denies chest pain, palpitations, edema, orthopnea, syncope   Respiratory: Denies dyspnea, cough, wheezing, postural nocturnal dyspnea   Gastrointestinal: Denies abdominal pain, nausea, vomiting, diarrhea, constipation, melena, hematochezia   Genitourinary: Denies dysuria, hematuria, frequency, urgency   Musculoskeletal: Denies back pain, neck pain, arthralgias, myalgias   Integumentary: Denies skin lesions, rashes, masses   Neurological: Denies dizziness, headaches, confusion, limb weakness, paresthesias, syncope, convulsions   Psychiatric: Denies depression, anxiety, homicidal ideations, suicidal ideations, sleep disturbances   Endocrine: Denies polyphagia, polydipsia, polyuria, weakness, hair thinning, heat intolerance, cold intolerance, weight changes   Heme/Lymph: Denies easy bruising, easy bleeding, swollen glands     Vitals:    05/20/24 1432   BP: 122/76   BP Location: Left arm   Pulse: 74   Resp: 16   Temp: 36.3 °C (97.4 °F)   SpO2: 97%   Weight: 92.5 kg (204 lb)   Height: 1.651 m (5' 5\")     PHYSICAL EXAM  General Appearance - well-developed, well-nourished, 64 y.o., White male in no acute distress.     Skin - warm, pink and dry without rash or concerning lesions.     Mental Status - alert and oriented x 3. Normal mood and affect appropriate to mood. "     Neck - supple without lymphadenopathy. Carotid pulses are normal without bruits. Thyroid is normal in midline without nodules.     Chest - lungs are clear to auscultation without rales, rhonchi or wheezes.     Heart - regular, rate and rhythm without murmurs, rubs or gallops.    Abdomen - soft, obese, protuberant, nontender, nondistended. No masses, hepatomegaly or splenomegaly is noted. No rebound, rigidity or guarding is noted. Bowel sounds are normoactive. Diastasis recti noted.     Extremities - no cyanosis, clubbing or edema. Pedal pulses are 2+ normal at the dorsalis pedis and posterior pulses bilaterally.     Neurological - cranial nerves II through XII are grossly intact. Motor strength 5/5 at all fours.     Lab Results   Component Value Date    HGBA1C 6.5 (H) 05/20/2024     ASSESSMENT:  1. Type 2 diabetes mellitus without complication, without long-term current use of insulin (Multi)  Follow Up In [facility] - PCP - Established    Hemoglobin A1C    Albumin , Urine Random      2. Benign essential HTN  Follow Up In [facility] - PCP - Established    CBC and Auto Differential    Comprehensive Metabolic Panel      3. Diabetic polyneuropathy associated with type 2 diabetes mellitus (Multi)  Follow Up In [facility] - PCP - Established      4. Class 1 obesity due to excess calories without serious comorbidity with body mass index (BMI) of 33.0 to 33.9 in adult        5. Screening PSA (prostate specific antigen)  Prostate Specific Antigen, Screen      6. Lipid screening  Lipid Panel      7. Need for hepatitis C screening test  Hepatitis C Antibody      PLAN  Patient will continue on a diabetic, low-cholesterol diet and weight reduction. Exercise as tolerated. He will continue medications as prescribed. Follow-up in 3 month(s) otherwise as needed.      Patient is to return for fasting CBC, CMP, lipid panel, PSA, A1c, urine albumin, Hep C labs at their convenience prior to their  next appointment. Fasting is nothing to eat or drink except water or black coffee for 8-12 hours. Will call patient with results when available.       Scribe Attestation  By signing my name below, I, Brooklyn Parnell, George   attest that this documentation has been prepared under the direction and in the presence of Ryan Valdez MD.

## 2024-08-12 ENCOUNTER — LAB (OUTPATIENT)
Dept: LAB | Facility: LAB | Age: 64
End: 2024-08-12
Payer: MEDICARE

## 2024-08-12 ENCOUNTER — HOSPITAL ENCOUNTER (OUTPATIENT)
Dept: RADIOLOGY | Facility: CLINIC | Age: 64
Discharge: HOME | End: 2024-08-12
Payer: MEDICARE

## 2024-08-12 DIAGNOSIS — R91.1 LUNG NODULE: ICD-10-CM

## 2024-08-12 DIAGNOSIS — Z11.59 NEED FOR HEPATITIS C SCREENING TEST: ICD-10-CM

## 2024-08-12 DIAGNOSIS — I10 BENIGN ESSENTIAL HTN: ICD-10-CM

## 2024-08-12 DIAGNOSIS — F17.210 CIGARETTE NICOTINE DEPENDENCE WITHOUT COMPLICATION: ICD-10-CM

## 2024-08-12 DIAGNOSIS — Z13.220 LIPID SCREENING: ICD-10-CM

## 2024-08-12 DIAGNOSIS — Z12.5 SCREENING PSA (PROSTATE SPECIFIC ANTIGEN): ICD-10-CM

## 2024-08-12 DIAGNOSIS — E11.9 TYPE 2 DIABETES MELLITUS WITHOUT COMPLICATION, WITHOUT LONG-TERM CURRENT USE OF INSULIN (MULTI): ICD-10-CM

## 2024-08-12 LAB
ALBUMIN SERPL BCP-MCNC: 4 G/DL (ref 3.4–5)
ALP SERPL-CCNC: 72 U/L (ref 33–136)
ALT SERPL W P-5'-P-CCNC: 13 U/L (ref 10–52)
ANION GAP SERPL CALC-SCNC: 13 MMOL/L (ref 10–20)
AST SERPL W P-5'-P-CCNC: 14 U/L (ref 9–39)
BASOPHILS # BLD AUTO: 0.07 X10*3/UL (ref 0–0.1)
BASOPHILS NFR BLD AUTO: 0.8 %
BILIRUB SERPL-MCNC: 0.4 MG/DL (ref 0–1.2)
BUN SERPL-MCNC: 21 MG/DL (ref 6–23)
CALCIUM SERPL-MCNC: 8.9 MG/DL (ref 8.6–10.3)
CHLORIDE SERPL-SCNC: 102 MMOL/L (ref 98–107)
CHOLEST SERPL-MCNC: 172 MG/DL (ref 0–199)
CHOLESTEROL/HDL RATIO: 3.8
CO2 SERPL-SCNC: 25 MMOL/L (ref 21–32)
CREAT SERPL-MCNC: 0.98 MG/DL (ref 0.5–1.3)
CREAT UR-MCNC: 129.2 MG/DL (ref 20–370)
EGFRCR SERPLBLD CKD-EPI 2021: 85 ML/MIN/1.73M*2
EOSINOPHIL # BLD AUTO: 0.08 X10*3/UL (ref 0–0.7)
EOSINOPHIL NFR BLD AUTO: 1 %
ERYTHROCYTE [DISTWIDTH] IN BLOOD BY AUTOMATED COUNT: 14.3 % (ref 11.5–14.5)
EST. AVERAGE GLUCOSE BLD GHB EST-MCNC: 137 MG/DL
GLUCOSE SERPL-MCNC: 109 MG/DL (ref 74–99)
HBA1C MFR BLD: 6.4 %
HCT VFR BLD AUTO: 40 % (ref 41–52)
HCV AB SER QL: NONREACTIVE
HDLC SERPL-MCNC: 45.1 MG/DL
HGB BLD-MCNC: 13.3 G/DL (ref 13.5–17.5)
IMM GRANULOCYTES # BLD AUTO: 0.07 X10*3/UL (ref 0–0.7)
IMM GRANULOCYTES NFR BLD AUTO: 0.8 % (ref 0–0.9)
LDLC SERPL CALC-MCNC: 113 MG/DL
LYMPHOCYTES # BLD AUTO: 1.46 X10*3/UL (ref 1.2–4.8)
LYMPHOCYTES NFR BLD AUTO: 17.4 %
MCH RBC QN AUTO: 31.8 PG (ref 26–34)
MCHC RBC AUTO-ENTMCNC: 33.3 G/DL (ref 32–36)
MCV RBC AUTO: 96 FL (ref 80–100)
MICROALBUMIN UR-MCNC: <7 MG/L
MICROALBUMIN/CREAT UR: NORMAL MG/G{CREAT}
MONOCYTES # BLD AUTO: 0.57 X10*3/UL (ref 0.1–1)
MONOCYTES NFR BLD AUTO: 6.8 %
NEUTROPHILS # BLD AUTO: 6.13 X10*3/UL (ref 1.2–7.7)
NEUTROPHILS NFR BLD AUTO: 73.2 %
NON HDL CHOLESTEROL: 127 MG/DL (ref 0–149)
NRBC BLD-RTO: 0 /100 WBCS (ref 0–0)
PLATELET # BLD AUTO: 266 X10*3/UL (ref 150–450)
POTASSIUM SERPL-SCNC: 4.8 MMOL/L (ref 3.5–5.3)
PROT SERPL-MCNC: 7.2 G/DL (ref 6.4–8.2)
PSA SERPL-MCNC: 0.6 NG/ML
RBC # BLD AUTO: 4.18 X10*6/UL (ref 4.5–5.9)
SODIUM SERPL-SCNC: 135 MMOL/L (ref 136–145)
TRIGL SERPL-MCNC: 72 MG/DL (ref 0–149)
VLDL: 14 MG/DL (ref 0–40)
WBC # BLD AUTO: 8.4 X10*3/UL (ref 4.4–11.3)

## 2024-08-12 PROCEDURE — 86803 HEPATITIS C AB TEST: CPT

## 2024-08-12 PROCEDURE — 36415 COLL VENOUS BLD VENIPUNCTURE: CPT

## 2024-08-12 PROCEDURE — 80053 COMPREHEN METABOLIC PANEL: CPT

## 2024-08-12 PROCEDURE — 85025 COMPLETE CBC W/AUTO DIFF WBC: CPT

## 2024-08-12 PROCEDURE — 71250 CT THORAX DX C-: CPT | Performed by: RADIOLOGY

## 2024-08-12 PROCEDURE — 82043 UR ALBUMIN QUANTITATIVE: CPT

## 2024-08-12 PROCEDURE — 71250 CT THORAX DX C-: CPT

## 2024-08-12 PROCEDURE — 82570 ASSAY OF URINE CREATININE: CPT

## 2024-08-12 PROCEDURE — G0103 PSA SCREENING: HCPCS

## 2024-08-12 PROCEDURE — 80061 LIPID PANEL: CPT

## 2024-08-12 PROCEDURE — 83036 HEMOGLOBIN GLYCOSYLATED A1C: CPT

## 2024-08-20 ENCOUNTER — APPOINTMENT (OUTPATIENT)
Dept: PRIMARY CARE | Facility: CLINIC | Age: 64
End: 2024-08-20
Payer: MEDICARE

## 2024-08-20 ENCOUNTER — TELEPHONE (OUTPATIENT)
Dept: PRIMARY CARE | Facility: CLINIC | Age: 64
End: 2024-08-20

## 2024-08-20 VITALS
DIASTOLIC BLOOD PRESSURE: 60 MMHG | BODY MASS INDEX: 33.32 KG/M2 | WEIGHT: 200 LBS | TEMPERATURE: 97.5 F | HEART RATE: 70 BPM | SYSTOLIC BLOOD PRESSURE: 130 MMHG | RESPIRATION RATE: 16 BRPM | OXYGEN SATURATION: 98 % | HEIGHT: 65 IN

## 2024-08-20 DIAGNOSIS — E66.09 CLASS 1 OBESITY DUE TO EXCESS CALORIES WITHOUT SERIOUS COMORBIDITY WITH BODY MASS INDEX (BMI) OF 33.0 TO 33.9 IN ADULT: ICD-10-CM

## 2024-08-20 DIAGNOSIS — L72.0 EPIDERMAL INCLUSION CYST: ICD-10-CM

## 2024-08-20 DIAGNOSIS — Z00.00 ROUTINE GENERAL MEDICAL EXAMINATION AT HEALTH CARE FACILITY: Primary | ICD-10-CM

## 2024-08-20 DIAGNOSIS — I10 BENIGN ESSENTIAL HTN: ICD-10-CM

## 2024-08-20 DIAGNOSIS — E11.9 TYPE 2 DIABETES MELLITUS WITHOUT COMPLICATION, WITHOUT LONG-TERM CURRENT USE OF INSULIN (MULTI): ICD-10-CM

## 2024-08-20 PROCEDURE — 1158F ADVNC CARE PLAN TLK DOCD: CPT | Performed by: FAMILY MEDICINE

## 2024-08-20 PROCEDURE — 99214 OFFICE O/P EST MOD 30 MIN: CPT | Performed by: FAMILY MEDICINE

## 2024-08-20 PROCEDURE — 2028F FOOT EXAM PERFORMED: CPT | Performed by: FAMILY MEDICINE

## 2024-08-20 PROCEDURE — 1159F MED LIST DOCD IN RCRD: CPT | Performed by: FAMILY MEDICINE

## 2024-08-20 PROCEDURE — 3062F POS MACROALBUMINURIA REV: CPT | Performed by: FAMILY MEDICINE

## 2024-08-20 PROCEDURE — G0439 PPPS, SUBSEQ VISIT: HCPCS | Performed by: FAMILY MEDICINE

## 2024-08-20 PROCEDURE — 1036F TOBACCO NON-USER: CPT | Performed by: FAMILY MEDICINE

## 2024-08-20 PROCEDURE — 4010F ACE/ARB THERAPY RXD/TAKEN: CPT | Performed by: FAMILY MEDICINE

## 2024-08-20 PROCEDURE — 1170F FXNL STATUS ASSESSED: CPT | Performed by: FAMILY MEDICINE

## 2024-08-20 PROCEDURE — 1123F ACP DISCUSS/DSCN MKR DOCD: CPT | Performed by: FAMILY MEDICINE

## 2024-08-20 PROCEDURE — 3075F SYST BP GE 130 - 139MM HG: CPT | Performed by: FAMILY MEDICINE

## 2024-08-20 PROCEDURE — 3078F DIAST BP <80 MM HG: CPT | Performed by: FAMILY MEDICINE

## 2024-08-20 PROCEDURE — 3049F LDL-C 100-129 MG/DL: CPT | Performed by: FAMILY MEDICINE

## 2024-08-20 PROCEDURE — 3044F HG A1C LEVEL LT 7.0%: CPT | Performed by: FAMILY MEDICINE

## 2024-08-20 PROCEDURE — 3008F BODY MASS INDEX DOCD: CPT | Performed by: FAMILY MEDICINE

## 2024-08-20 PROCEDURE — 1160F RVW MEDS BY RX/DR IN RCRD: CPT | Performed by: FAMILY MEDICINE

## 2024-08-20 RX ORDER — SULFAMETHOXAZOLE AND TRIMETHOPRIM 800; 160 MG/1; MG/1
1 TABLET ORAL 2 TIMES DAILY
Qty: 14 TABLET | Refills: 0 | Status: SHIPPED | OUTPATIENT
Start: 2024-08-20 | End: 2024-08-27

## 2024-08-20 RX ORDER — ROSUVASTATIN CALCIUM 10 MG/1
10 TABLET, COATED ORAL DAILY
Qty: 90 TABLET | Refills: 3 | Status: SHIPPED | OUTPATIENT
Start: 2024-08-20 | End: 2025-08-20

## 2024-08-20 ASSESSMENT — ACTIVITIES OF DAILY LIVING (ADL)
BATHING: INDEPENDENT
DOING_HOUSEWORK: INDEPENDENT
TAKING_MEDICATION: INDEPENDENT
DRESSING: INDEPENDENT
GROCERY_SHOPPING: INDEPENDENT
MANAGING_FINANCES: INDEPENDENT

## 2024-08-20 NOTE — PROGRESS NOTES
"Subjective   Reason for Visit: Dixon Pino is an 64 y.o. male here for a Medicare Wellness visit.     Past Medical, Surgical, and Family History reviewed and updated in chart.    Reviewed all medications by prescribing practitioner or clinical pharmacist (such as prescriptions, OTCs, herbal therapies and supplements) and documented in the medical record.    HPI    Patient Care Team:  Ryan Valdez MD as PCP - General (Family Medicine)  Ryan Valdez MD as PCP - [employer] PCP     Review of Systems    Objective   Vitals:  /60 (BP Location: Right arm)   Pulse 70   Temp 36.4 °C (97.5 °F)   Resp 16   Ht 1.651 m (5' 5\")   Wt 90.7 kg (200 lb)   SpO2 98%   BMI 33.28 kg/m²       Physical Exam    Assessment/Plan   Problem List Items Addressed This Visit             ICD-10-CM    Type 2 diabetes mellitus without complication, without long-term current use of insulin (Multi) E11.9    Relevant Medications    rosuvastatin (Crestor) 10 mg tablet    Other Relevant Orders    Hepatic Function Panel    Lipid Panel    Hemoglobin A1C     Other Visit Diagnoses         Codes    Routine general medical examination at health care facility    -  Primary Z00.00    Relevant Orders    1 Year Follow Up In [facility] - PCP - Wellness Exam    1 Year Follow Up In [facility] - PCP - Wellness Exam    Epidermal inclusion cyst     L72.0    Relevant Medications    sulfamethoxazole-trimethoprim (Bactrim DS) 800-160 mg tablet                 "

## 2024-08-20 NOTE — TELEPHONE ENCOUNTER
Left VM to call the office back regarding  Dr. Valdez ordered stool samples . Pt needs to come back to office to  supplies. Supplies have been placed upfront for Pt to  at his earliest convenience.

## 2024-08-20 NOTE — PROGRESS NOTES
HPI: Dixon Pino is a 64 y.o. male presenting for follow-up of Medicare Annual Wellness Visit Subsequent and Follow-up (Benign essential HTN/Type 2 diabetes mellitus without complication, without long-term current use of insulin (Multi))  . I last saw the patient 5/20/2024      Patient is here for a AWV  Last A1C done 8/12  = 6.4  Mentions he has a lump under left arm pit he would like checked out, its been there about 10 days, it doesn't hurt but is uncomfortable it has went down in size a little.    Patient blood sugars have been under good control.       Past medical, surgical, and family history reviewed.  Reviewed and documented all medications   Pt eating well, exercising as tolerated and taking medications as directed    Has no further medical concerns for rooming MA      ROS  Except positives as noted in the CC & HPI   Constitutional: Denies fevers, chills, night sweats, fatigue, weight changes, change in appetite   Eyes: Denies blurry vision, double vision   ENT: Denies otalgia, trouble hearing, tinnitus, vertigo, nasal congestion, rhinorrhea, sore throat   Neck: Denies swelling, masses   Cardiovascular: Denies chest pain, palpitations, edema, orthopnea, syncope   Respiratory: Denies dyspnea, cough, wheezing, postural nocturnal dyspnea   Gastrointestinal: Denies abdominal pain, nausea, vomiting, diarrhea, constipation, melena, hematochezia   Genitourinary: Denies dysuria, hematuria, frequency, urgency   Musculoskeletal: Denies back pain, neck pain, arthralgias, myalgias   Integumentary: Denies skin lesions, rashes, masses   Neurological: Denies dizziness, headaches, confusion, limb weakness, paresthesias, syncope, convulsions   Psychiatric: Denies depression, anxiety, homicidal ideations, suicidal ideations, sleep disturbances   Endocrine: Denies polyphagia, polydipsia, polyuria, weakness, hair thinning, heat intolerance, cold intolerance, weight changes   Heme/Lymph: Denies easy bruising, easy  "bleeding, swollen glands     Vitals:    08/20/24 1130   BP: 130/60   BP Location: Right arm   Pulse: 70   Resp: 16   Temp: 36.4 °C (97.5 °F)   SpO2: 98%   Weight: 90.7 kg (200 lb)   Height: 1.651 m (5' 5\")       PHYSICAL EXAM  General Appearance - well-developed, well-nourished, 64 y.o., White male in no acute distress.     Skin - warm, pink and dry without rash or concerning lesions.     Mental Status - alert and oriented x 3. Normal mood and affect appropriate to mood.     Neck - supple without lymphadenopathy. Carotid pulses are normal without bruits. Thyroid is normal in midline without nodules.     Chest - lungs are clear to auscultation without rales, rhonchi or wheezes.     Left Axilla - 0.5 cm subcutaneous nodularity in the superior aspect of the axilla. Slightly tender to pressure.    Heart - regular, rate and rhythm without murmurs, rubs or gallops.    Abdomen - soft, obese, protuberant, nontender, nondistended. No masses, hepatomegaly or splenomegaly is noted. No rebound, rigidity or guarding is noted. Bowel sounds are normoactive. Diastasis recti noted.     Extremities - no cyanosis, clubbing or edema. Pedal pulses are 2+ normal at the dorsalis pedis and posterior pulses bilaterally.     Neurological - cranial nerves II through XII are grossly intact. Motor strength 5/5 at all fours.     Lab Results   Component Value Date    HGBA1C 6.4 (H) 08/12/2024     ASSESSMENT:  1. Routine general medical examination at health care facility  1 Year Follow Up In [facility] - PCP - Wellness Exam    1 Year Follow Up In [facility] - PCP - Wellness Exam      2. Benign essential HTN        3. Type 2 diabetes mellitus without complication, without long-term current use of insulin (Multi)  rosuvastatin (Crestor) 10 mg tablet    Hepatic Function Panel    Lipid Panel    Hemoglobin A1C      4. Epidermal inclusion cyst  sulfamethoxazole-trimethoprim (Bactrim DS) 800-160 mg tablet      5. Class 1 obesity " due to excess calories without serious comorbidity with body mass index (BMI) of 33.0 to 33.9 in adult          PLAN  Patient will continue on a diabetic, low-cholesterol diet and weight reduction. Exercise as tolerated. He will continue medications as prescribed. Follow-up in 3 month(s) otherwise as needed.      Hemoglobin A1c is in Pre- diabetic range at 6.4%.  Patient is to continue diabetic diet.       Patient is to return for fasting Lipid profile, A1c, Hepatic function panel labs at their convenience prior to their next appointment. Fasting is nothing to eat or drink except water or black coffee for 8-12 hours. Will call patient with results when available.     Patient was started on:   Rosuvastatin Calcium 10 mg, TAKE 1 TAB BY MOUTH NIGHTLY   Sulfamethoxazole-Trimethoprim 800-160 mg, TAKE 1 TAB BY MOUTH TWICE DAILY FOR 10 DAYS     Rx(s) sent to pharmacy.      Patient was given Guaiac cards x 3 for testing.    Consider cyst removal if it does not resolve.    Scribe Attestation  By signing my name below, IThao , George   attest that this documentation has been prepared under the direction and in the presence of Ryan Valdez MD.

## 2024-11-19 ENCOUNTER — LAB (OUTPATIENT)
Dept: LAB | Facility: LAB | Age: 64
End: 2024-11-19
Payer: MEDICARE

## 2024-11-19 DIAGNOSIS — E11.9 TYPE 2 DIABETES MELLITUS WITHOUT COMPLICATION, WITHOUT LONG-TERM CURRENT USE OF INSULIN (MULTI): ICD-10-CM

## 2024-11-19 DIAGNOSIS — I10 BENIGN ESSENTIAL HTN: ICD-10-CM

## 2024-11-19 DIAGNOSIS — E66.811 CLASS 1 OBESITY DUE TO EXCESS CALORIES WITHOUT SERIOUS COMORBIDITY WITH BODY MASS INDEX (BMI) OF 33.0 TO 33.9 IN ADULT: ICD-10-CM

## 2024-11-19 DIAGNOSIS — E66.09 CLASS 1 OBESITY DUE TO EXCESS CALORIES WITHOUT SERIOUS COMORBIDITY WITH BODY MASS INDEX (BMI) OF 33.0 TO 33.9 IN ADULT: ICD-10-CM

## 2024-11-19 LAB
ALBUMIN SERPL BCP-MCNC: 4.2 G/DL (ref 3.4–5)
ALP SERPL-CCNC: 78 U/L (ref 33–136)
ALT SERPL W P-5'-P-CCNC: 16 U/L (ref 10–52)
AST SERPL W P-5'-P-CCNC: 15 U/L (ref 9–39)
BILIRUB DIRECT SERPL-MCNC: 0.1 MG/DL (ref 0–0.3)
BILIRUB SERPL-MCNC: 0.4 MG/DL (ref 0–1.2)
CHOLEST SERPL-MCNC: 184 MG/DL (ref 0–199)
CHOLESTEROL/HDL RATIO: 4
EST. AVERAGE GLUCOSE BLD GHB EST-MCNC: 128 MG/DL
HBA1C MFR BLD: 6.1 %
HDLC SERPL-MCNC: 46.2 MG/DL
LDLC SERPL CALC-MCNC: 115 MG/DL
NON HDL CHOLESTEROL: 138 MG/DL (ref 0–149)
PROT SERPL-MCNC: 7.3 G/DL (ref 6.4–8.2)
TRIGL SERPL-MCNC: 114 MG/DL (ref 0–149)
VLDL: 23 MG/DL (ref 0–40)

## 2024-11-19 PROCEDURE — 36415 COLL VENOUS BLD VENIPUNCTURE: CPT

## 2024-11-19 PROCEDURE — 84443 ASSAY THYROID STIM HORMONE: CPT

## 2024-11-19 PROCEDURE — 80076 HEPATIC FUNCTION PANEL: CPT

## 2024-11-19 PROCEDURE — 83036 HEMOGLOBIN GLYCOSYLATED A1C: CPT

## 2024-11-19 PROCEDURE — 80061 LIPID PANEL: CPT

## 2024-11-21 ENCOUNTER — APPOINTMENT (OUTPATIENT)
Dept: PRIMARY CARE | Facility: CLINIC | Age: 64
End: 2024-11-21
Payer: MEDICARE

## 2024-11-21 VITALS
OXYGEN SATURATION: 98 % | SYSTOLIC BLOOD PRESSURE: 130 MMHG | WEIGHT: 200 LBS | DIASTOLIC BLOOD PRESSURE: 66 MMHG | BODY MASS INDEX: 33.32 KG/M2 | HEART RATE: 93 BPM | TEMPERATURE: 98.2 F | RESPIRATION RATE: 16 BRPM | HEIGHT: 65 IN

## 2024-11-21 DIAGNOSIS — L72.0 EPIDERMAL INCLUSION CYST: ICD-10-CM

## 2024-11-21 DIAGNOSIS — I10 BENIGN ESSENTIAL HTN: ICD-10-CM

## 2024-11-21 DIAGNOSIS — Z09 ENCOUNTER FOR FOLLOW-UP: Primary | ICD-10-CM

## 2024-11-21 DIAGNOSIS — E66.09 CLASS 1 OBESITY DUE TO EXCESS CALORIES WITHOUT SERIOUS COMORBIDITY WITH BODY MASS INDEX (BMI) OF 33.0 TO 33.9 IN ADULT: ICD-10-CM

## 2024-11-21 DIAGNOSIS — Z23 FLU VACCINE NEED: ICD-10-CM

## 2024-11-21 DIAGNOSIS — E66.811 CLASS 1 OBESITY DUE TO EXCESS CALORIES WITHOUT SERIOUS COMORBIDITY WITH BODY MASS INDEX (BMI) OF 33.0 TO 33.9 IN ADULT: ICD-10-CM

## 2024-11-21 DIAGNOSIS — E11.9 TYPE 2 DIABETES MELLITUS WITHOUT COMPLICATION, WITHOUT LONG-TERM CURRENT USE OF INSULIN (MULTI): ICD-10-CM

## 2024-11-21 DIAGNOSIS — E78.00 PURE HYPERCHOLESTEROLEMIA: ICD-10-CM

## 2024-11-21 LAB — TSH SERPL-ACNC: 2.51 MIU/L (ref 0.44–3.98)

## 2024-11-21 PROCEDURE — G0008 ADMIN INFLUENZA VIRUS VAC: HCPCS | Performed by: FAMILY MEDICINE

## 2024-11-21 PROCEDURE — 3078F DIAST BP <80 MM HG: CPT | Performed by: FAMILY MEDICINE

## 2024-11-21 PROCEDURE — 3008F BODY MASS INDEX DOCD: CPT | Performed by: FAMILY MEDICINE

## 2024-11-21 PROCEDURE — 1160F RVW MEDS BY RX/DR IN RCRD: CPT | Performed by: FAMILY MEDICINE

## 2024-11-21 PROCEDURE — 99213 OFFICE O/P EST LOW 20 MIN: CPT | Performed by: FAMILY MEDICINE

## 2024-11-21 PROCEDURE — 1159F MED LIST DOCD IN RCRD: CPT | Performed by: FAMILY MEDICINE

## 2024-11-21 PROCEDURE — 3062F POS MACROALBUMINURIA REV: CPT | Performed by: FAMILY MEDICINE

## 2024-11-21 PROCEDURE — 4010F ACE/ARB THERAPY RXD/TAKEN: CPT | Performed by: FAMILY MEDICINE

## 2024-11-21 PROCEDURE — 2028F FOOT EXAM PERFORMED: CPT | Performed by: FAMILY MEDICINE

## 2024-11-21 PROCEDURE — 3044F HG A1C LEVEL LT 7.0%: CPT | Performed by: FAMILY MEDICINE

## 2024-11-21 PROCEDURE — 3049F LDL-C 100-129 MG/DL: CPT | Performed by: FAMILY MEDICINE

## 2024-11-21 PROCEDURE — 90662 IIV NO PRSV INCREASED AG IM: CPT | Performed by: FAMILY MEDICINE

## 2024-11-21 PROCEDURE — G2211 COMPLEX E/M VISIT ADD ON: HCPCS | Performed by: FAMILY MEDICINE

## 2024-11-21 PROCEDURE — 1036F TOBACCO NON-USER: CPT | Performed by: FAMILY MEDICINE

## 2024-11-21 PROCEDURE — 3075F SYST BP GE 130 - 139MM HG: CPT | Performed by: FAMILY MEDICINE

## 2024-11-21 PROCEDURE — 1123F ACP DISCUSS/DSCN MKR DOCD: CPT | Performed by: FAMILY MEDICINE

## 2024-11-21 RX ORDER — ATORVASTATIN CALCIUM 20 MG/1
20 TABLET, FILM COATED ORAL DAILY
Qty: 90 TABLET | Refills: 3 | Status: SHIPPED | OUTPATIENT
Start: 2024-11-21 | End: 2025-11-21

## 2024-11-21 RX ORDER — SULFAMETHOXAZOLE AND TRIMETHOPRIM 800; 160 MG/1; MG/1
1 TABLET ORAL 2 TIMES DAILY
Qty: 14 TABLET | Refills: 0 | Status: SHIPPED | OUTPATIENT
Start: 2024-11-21 | End: 2024-11-28

## 2024-11-21 ASSESSMENT — PATIENT HEALTH QUESTIONNAIRE - PHQ9
1. LITTLE INTEREST OR PLEASURE IN DOING THINGS: NOT AT ALL
2. FEELING DOWN, DEPRESSED OR HOPELESS: NOT AT ALL
SUM OF ALL RESPONSES TO PHQ9 QUESTIONS 1 AND 2: 0

## 2024-11-21 NOTE — PROGRESS NOTES
HPI: Dixon Pino is a 64 y.o. male presenting for follow-up of Follow-up (Type 2 diabetes mellitus without complication, without long-term current use of insulin (Multi)//)  . I last saw the patient 8/20/2024      Patient is here for a F/U       Pt said yes to flu vaccine, rolando CONKLIN administered   A1C 11/19 = 6.1%     He mentions he quit taking the most recent medication he was prescribed due to it making him feel bad    He says he has a lump under his right arm    Past medical, surgical, and family history reviewed.  Reviewed and documented all medications   Pt eating well, exercising as tolerated and taking medications as directed    Has no medical concerns for rooming MA    The patient denies any changes in vision, hearing or dental.     The patient maintains they do not have any chest pain, chest tightness or shortness of breath.    They do not experience nausea, emesis, changes in bowel movements or dyspepsia.      ROS  Except positives as noted in the CC & HPI   Constitutional: Denies fevers, chills, night sweats, fatigue, weight changes, change in appetite   Eyes: Denies blurry vision, double vision   ENT: Denies otalgia, trouble hearing, tinnitus, vertigo, nasal congestion, rhinorrhea, sore throat   Neck: Denies swelling, masses   Cardiovascular: Denies chest pain, palpitations, edema, orthopnea, syncope   Respiratory: Denies dyspnea, cough, wheezing, postural nocturnal dyspnea   Gastrointestinal: Denies abdominal pain, nausea, vomiting, diarrhea, constipation, melena, hematochezia   Genitourinary: Denies dysuria, hematuria, frequency, urgency   Musculoskeletal: Denies back pain, neck pain, arthralgias, myalgias   Integumentary: Denies skin lesions, rashes, masses   Neurological: Denies dizziness, headaches, confusion, limb weakness, paresthesias, syncope, convulsions   Psychiatric: Denies depression, anxiety, homicidal ideations, suicidal ideations, sleep disturbances   Endocrine: Denies polyphagia,  "polydipsia, polyuria, weakness, hair thinning, heat intolerance, cold intolerance, weight changes   Heme/Lymph: Denies easy bruising, easy bleeding, swollen glands     Vitals:    11/21/24 1126   BP: 130/66   Pulse: 93   Resp: 16   Temp: 36.8 °C (98.2 °F)   SpO2: 98%   Weight: 90.7 kg (200 lb)   Height: 1.651 m (5' 5\")         PHYSICAL EXAM  General Appearance - well-developed, well-nourished, 64 y.o., White male in no acute distress.     Skin - warm, pink and dry without rash or concerning lesions.     Mental Status - alert and oriented x 3. Normal mood and affect appropriate to mood.     Neck - supple without lymphadenopathy. Carotid pulses are normal without bruits. Thyroid is normal in midline without nodules.     Chest - lungs are clear to auscultation without rales, rhonchi or wheezes.     Right Axilla - 1 cm cutaneous bilobed nodularity in the superior aspect of the axilla. Slightly tender to pressure. In the mid axilla, a 0.5 cm cutaneous lesion was noted.     Heart - regular, rate and rhythm without murmurs, rubs or gallops.    Abdomen - soft, obese, protuberant, nontender, nondistended. No masses, hepatomegaly or splenomegaly is noted. No rebound, rigidity or guarding is noted. Bowel sounds are normoactive. Diastasis recti noted.     Extremities - no cyanosis, clubbing or edema. Pedal pulses are 2+ normal at the dorsalis pedis and posterior pulses bilaterally.     Neurological - cranial nerves II through XII are grossly intact. Motor strength 5/5 at all fours.     Lab Results   Component Value Date    HGBA1C 6.1 (H) 11/19/2024     ASSESSMENT:  1. Encounter for follow-up        2. Epidermal inclusion cyst  sulfamethoxazole-trimethoprim (Bactrim DS) 800-160 mg tablet      3. Pure hypercholesterolemia  Hepatic Function Panel    Lipid Panel      4. Benign essential HTN  TSH with reflex to Free T4 if abnormal      5. Type 2 diabetes mellitus without complication, without long-term current use of insulin (Multi)  " atorvastatin (Lipitor) 20 mg tablet    Lipid Panel      6. Flu vaccine need  Flu vaccine, trivalent, preservative free, HIGH-DOSE, age 65y+ (Fluzone)      7. Class 1 obesity due to excess calories without serious comorbidity with body mass index (BMI) of 33.0 to 33.9 in adult  TSH with reflex to Free T4 if abnormal          PLAN    For weight management I recommend exercising and remaining physically active. Try to maintain a heathy diet that includes green leafy vegetables, fruits and proteins. You are encouraged to stay well hydrated.    Patient was advised to limit their salt intake and to not add any extra salt to their food. Patient is to avoid pretzels, chips, lunch meats, canned soups, soda pop, ham, ugarte, hot dogs, etc.    Patient will continue on a diabetic, low-cholesterol diet and weight reduction. Exercise as tolerated.     Will obtain hepatic panel, Lipid and TSH prior to patient's next appointment. Will call patient with results when available.    Patient was given refill(s) on:   Atorvastatin Calcium 20 mg, TAKE 1 TAB BY MOUTH DAILY    Rx(s) sent to pharmacy.    Patient was started on:   Sulfamethoxazole-Trimethoprim 800-160 mg, TAKE 1 TAB BY MOUTH TWICE DAILY FOR 7 DAYS    Rx(s) sent to pharmacy.    Patient to continue current medications (with any exceptions as noted) and diet. Follow-up in 3 month(s) otherwise as needed.     Please keep all future appointments with Dr. Bowen in ophthalmology.     The patient  received  a flu shot today.    Scribe Attestation  By signing my name below, INatalee Scribe   attest that this documentation has been prepared under the direction and in the presence of Ryan Valdez MD.    This note has been transcribed using a medical scribe and there is a possibility of unintentional typing misprints.

## 2025-01-30 DIAGNOSIS — E11.9 TYPE 2 DIABETES MELLITUS WITHOUT COMPLICATION, WITHOUT LONG-TERM CURRENT USE OF INSULIN (MULTI): ICD-10-CM

## 2025-01-30 RX ORDER — METFORMIN HYDROCHLORIDE 500 MG/1
TABLET, EXTENDED RELEASE ORAL
Qty: 90 TABLET | Refills: 3 | Status: SHIPPED | OUTPATIENT
Start: 2025-01-30

## 2025-02-18 LAB
ALBUMIN SERPL-MCNC: 4.5 G/DL (ref 3.6–5.1)
ALBUMIN/GLOB SERPL: 1.4 (CALC) (ref 1–2.5)
ALP SERPL-CCNC: 77 U/L (ref 35–144)
ALT SERPL-CCNC: 14 U/L (ref 9–46)
AST SERPL-CCNC: 13 U/L (ref 10–35)
BILIRUB DIRECT SERPL-MCNC: 0.1 MG/DL
BILIRUB INDIRECT SERPL-MCNC: 0.3 MG/DL (CALC) (ref 0.2–1.2)
BILIRUB SERPL-MCNC: 0.4 MG/DL (ref 0.2–1.2)
CHOLEST SERPL-MCNC: 146 MG/DL
CHOLEST/HDLC SERPL: 3.1 (CALC)
GLOBULIN SER CALC-MCNC: 3.3 G/DL (CALC) (ref 1.9–3.7)
HDLC SERPL-MCNC: 47 MG/DL
LDLC SERPL CALC-MCNC: 80 MG/DL (CALC)
NONHDLC SERPL-MCNC: 99 MG/DL (CALC)
PROT SERPL-MCNC: 7.8 G/DL (ref 6.1–8.1)
TRIGL SERPL-MCNC: 109 MG/DL

## 2025-02-24 NOTE — PROGRESS NOTES
Subjective   Patient ID: Dixon Pino is a 64 y.o. male who presents for Follow Up of Hypertension, Diabetes and Polyneuropathy . I last saw the patient on 11/21/2024  .     HPI  Patient has labs to be discussed today      Patient states that he will sometimes get numbness and tingling in his fingers and toes that will subside when he shakes his arms. Patient states that this is happening when he is sitting in his recliner. He is tend to fall asleep in his recliner. Patient noticed the symptoms at night as well while sleeping.    Patient also mentions feeling tired and fatigued all the time for quite sometime.     Past medical, surgical, and family history reviewed.  Reviewed and documented all medications   Pt eating well, exercising as tolerated and taking medications as directed.      Review of Systems  Except positives as noted in the CC & HPI      Constitutional: Denies fevers, chills, night sweats, fatigue, weight changes, change in appetite    Eyes: Denies blurry vision, double vision    ENT: Denies otalgia, trouble hearing, tinnitus, vertigo, nasal congestion, rhinorrhea, sore throat    Neck: Denies swelling, masses    Cardiovascular: Denies chest pain, palpitations, edema, orthopnea, syncope    Respiratory: Denies dyspnea, cough, wheezing, postural nocturnal dyspnea    Gastrointestinal: Denies abdominal pain, nausea, vomiting, diarrhea, constipation, melena, hematochezia    Genitourinary: Denies dysuria, hematuria  Musculoskeletal: Denies back pain, neck pain, arthralgias, myalgias    Integumentary: Denies skin lesions, rashes, masses    Neurological: Denies dizziness, headaches, confusion, limb weakness, paresthesias, syncope, convulsions    Psychiatric: Denies depression, anxiety, homicidal ideations, suicidal ideations, sleep disturbances    Endocrine: Denies polyphagia, polydipsia, polyuria, weakness, hair thinning, heat intolerance, cold intolerance, weight changes    Heme/Lymph: Denies easy  "bruising, easy bleeding, swollen glands    Objective   Vitals:    02/25/25 1126 02/25/25 1205   BP: 102/64 126/60   BP Location:  Right arm   Patient Position:  Sitting   BP Cuff Size:  Large adult   Pulse: 70    Resp: 16    Temp: 36.4 °C (97.6 °F)    SpO2: 99%    Weight: 94.3 kg (208 lb)    Height: 1.651 m (5' 5\")         Physical Exam  General Appearance - well-developed, well-nourished, 64 y.o., White male in no acute distress.      Skin - warm, pink and dry without rash or concerning lesions.      Mental Status - alert and oriented x 3. Normal mood and affect appropriate to mood.      Neck - supple without lymphadenopathy. Carotid pulses are normal without bruits. Thyroid is normal in midline without nodules.      Chest - lungs are clear to auscultation without rales, rhonchi or wheezes.      Heart - regular, rate and rhythm without murmurs, rubs or gallops.     Abdomen - soft, obese, protuberant, nontender, nondistended. No masses, hepatomegaly or splenomegaly is noted. No rebound, rigidity or guarding is noted. Bowel sounds are normoactive. Diastasis recti noted.      Extremities - no cyanosis, clubbing or edema. Pedal pulses are 2+ normal at the dorsalis pedis and posterior pulses bilaterally.     Hands - positive Tinel's and Phalen's test bilaterally. The patient does have normal oppositional thumb strength bilaterally. The  thenar eminences are full bilaterally.      Neurological - cranial nerves II through XII are grossly intact. Motor strength 5/5 at all fours.     Lab Results   Component Value Date    ALBUMIN 4.5 02/17/2025    ALT 14 02/17/2025    AST 13 02/17/2025    BUN 21 08/12/2024    CALCIUM 8.9 08/12/2024     08/12/2024    CHOL 146 02/17/2025    CO2 25 08/12/2024    CREATININE 0.98 08/12/2024    EGFR 85 08/12/2024    GLUCOSE 109 (H) 08/12/2024    HDL 47 02/17/2025    HCT 40.0 (L) 08/12/2024    HGB 13.3 (L) 08/12/2024    HGBA1C 7 (A) 02/25/2025    K 4.8 08/12/2024    LDLCALC 80 02/17/2025    "  (L) 08/12/2024     08/12/2024    PSASCREEN 0.60 08/12/2024    TRIG 109 02/17/2025    TSH 2.51 11/19/2024    WBC 8.4 08/12/2024        Assessment   1. Type 2 diabetes mellitus without complication, without long-term current use of insulin (Multi)  POCT glycosylated hemoglobin (Hb A1C) manually resulted      2. Diabetic polyneuropathy associated with type 2 diabetes mellitus (Multi)        3. Benign essential HTN        4. Bilateral carpal tunnel syndrome        5. Ulnar neuropathy of both upper extremities          Patient to continue current medications (with any exceptions as noted) and diet. Follow-up in 3 month(s) otherwise as needed.      Will obtain Hemoglobin A1C today. Will call patient with results when available.      Patient was advised to check his blood sugars regularly at home if possible. Patient declined today.     Discussed medications to help with his sleep and energy including Trazodone and mirtazapine. He declined at this time.    Patient is to purchase bilateral wrist braces to help with his CTS. He may wear them during the day and bedtime if needed.    Scribe Attestation  By signing my name below, IThao , Scribvilma   attest that this documentation has been prepared under the direction and in the presence of Ryan Valdez MD.

## 2025-02-25 ENCOUNTER — APPOINTMENT (OUTPATIENT)
Dept: PRIMARY CARE | Facility: CLINIC | Age: 65
End: 2025-02-25
Payer: MEDICARE

## 2025-02-25 VITALS
BODY MASS INDEX: 34.66 KG/M2 | HEART RATE: 70 BPM | SYSTOLIC BLOOD PRESSURE: 126 MMHG | DIASTOLIC BLOOD PRESSURE: 60 MMHG | HEIGHT: 65 IN | WEIGHT: 208 LBS | OXYGEN SATURATION: 99 % | RESPIRATION RATE: 16 BRPM | TEMPERATURE: 97.6 F

## 2025-02-25 DIAGNOSIS — G56.03 BILATERAL CARPAL TUNNEL SYNDROME: ICD-10-CM

## 2025-02-25 DIAGNOSIS — G56.23 ULNAR NEUROPATHY OF BOTH UPPER EXTREMITIES: ICD-10-CM

## 2025-02-25 DIAGNOSIS — E11.9 TYPE 2 DIABETES MELLITUS WITHOUT COMPLICATION, WITHOUT LONG-TERM CURRENT USE OF INSULIN (MULTI): Primary | ICD-10-CM

## 2025-02-25 DIAGNOSIS — E11.42 DIABETIC POLYNEUROPATHY ASSOCIATED WITH TYPE 2 DIABETES MELLITUS (MULTI): ICD-10-CM

## 2025-02-25 DIAGNOSIS — I10 BENIGN ESSENTIAL HTN: ICD-10-CM

## 2025-02-25 LAB — POC HEMOGLOBIN A1C: 7 % (ref 4.2–6.5)

## 2025-02-25 PROCEDURE — G2211 COMPLEX E/M VISIT ADD ON: HCPCS | Performed by: FAMILY MEDICINE

## 2025-02-25 PROCEDURE — 4010F ACE/ARB THERAPY RXD/TAKEN: CPT | Performed by: FAMILY MEDICINE

## 2025-02-25 PROCEDURE — 83036 HEMOGLOBIN GLYCOSYLATED A1C: CPT | Performed by: FAMILY MEDICINE

## 2025-02-25 PROCEDURE — 3078F DIAST BP <80 MM HG: CPT | Performed by: FAMILY MEDICINE

## 2025-02-25 PROCEDURE — 3008F BODY MASS INDEX DOCD: CPT | Performed by: FAMILY MEDICINE

## 2025-02-25 PROCEDURE — 1036F TOBACCO NON-USER: CPT | Performed by: FAMILY MEDICINE

## 2025-02-25 PROCEDURE — 3074F SYST BP LT 130 MM HG: CPT | Performed by: FAMILY MEDICINE

## 2025-02-25 PROCEDURE — 1123F ACP DISCUSS/DSCN MKR DOCD: CPT | Performed by: FAMILY MEDICINE

## 2025-02-25 PROCEDURE — 99214 OFFICE O/P EST MOD 30 MIN: CPT | Performed by: FAMILY MEDICINE

## 2025-02-25 PROCEDURE — 1159F MED LIST DOCD IN RCRD: CPT | Performed by: FAMILY MEDICINE

## 2025-02-25 ASSESSMENT — ANXIETY QUESTIONNAIRES
7. FEELING AFRAID AS IF SOMETHING AWFUL MIGHT HAPPEN: NOT AT ALL
4. TROUBLE RELAXING: NOT AT ALL
1. FEELING NERVOUS, ANXIOUS, OR ON EDGE: NOT AT ALL
2. NOT BEING ABLE TO STOP OR CONTROL WORRYING: NOT AT ALL
6. BECOMING EASILY ANNOYED OR IRRITABLE: NOT AT ALL
GAD7 TOTAL SCORE: 0
5. BEING SO RESTLESS THAT IT IS HARD TO SIT STILL: NOT AT ALL
3. WORRYING TOO MUCH ABOUT DIFFERENT THINGS: NOT AT ALL
IF YOU CHECKED OFF ANY PROBLEMS ON THIS QUESTIONNAIRE, HOW DIFFICULT HAVE THESE PROBLEMS MADE IT FOR YOU TO DO YOUR WORK, TAKE CARE OF THINGS AT HOME, OR GET ALONG WITH OTHER PEOPLE: NOT DIFFICULT AT ALL

## 2025-02-25 ASSESSMENT — ENCOUNTER SYMPTOMS
DEPRESSION: 0
LOSS OF SENSATION IN FEET: 0
OCCASIONAL FEELINGS OF UNSTEADINESS: 0

## 2025-03-14 DIAGNOSIS — I10 BENIGN ESSENTIAL HTN: ICD-10-CM

## 2025-03-14 NOTE — TELEPHONE ENCOUNTER
Rx Refill Request     Name: Dixon Pino  :  1960     Date of last appointment:  2025   Date of next appointment:  2025   Best number to reach patient:  [phone]

## 2025-03-16 RX ORDER — LISINOPRIL 20 MG/1
20 TABLET ORAL DAILY
Qty: 90 TABLET | Refills: 3 | Status: SHIPPED | OUTPATIENT
Start: 2025-03-16

## 2025-05-28 ENCOUNTER — APPOINTMENT (OUTPATIENT)
Dept: PRIMARY CARE | Facility: CLINIC | Age: 65
End: 2025-05-28
Payer: MEDICARE

## 2025-05-28 VITALS
HEART RATE: 75 BPM | BODY MASS INDEX: 35.06 KG/M2 | TEMPERATURE: 97.6 F | WEIGHT: 210.4 LBS | OXYGEN SATURATION: 99 % | RESPIRATION RATE: 16 BRPM | SYSTOLIC BLOOD PRESSURE: 124 MMHG | HEIGHT: 65 IN | DIASTOLIC BLOOD PRESSURE: 62 MMHG

## 2025-05-28 DIAGNOSIS — E78.00 PURE HYPERCHOLESTEROLEMIA: ICD-10-CM

## 2025-05-28 DIAGNOSIS — E11.9 TYPE 2 DIABETES MELLITUS WITHOUT COMPLICATION, WITHOUT LONG-TERM CURRENT USE OF INSULIN: ICD-10-CM

## 2025-05-28 DIAGNOSIS — Z12.5 SCREENING PSA (PROSTATE SPECIFIC ANTIGEN): ICD-10-CM

## 2025-05-28 DIAGNOSIS — E66.09 CLASS 2 OBESITY DUE TO EXCESS CALORIES WITHOUT SERIOUS COMORBIDITY WITH BODY MASS INDEX (BMI) OF 35.0 TO 35.9 IN ADULT: ICD-10-CM

## 2025-05-28 DIAGNOSIS — M54.50 CHRONIC BILATERAL LOW BACK PAIN WITHOUT SCIATICA: ICD-10-CM

## 2025-05-28 DIAGNOSIS — Z00.00 ENCOUNTER FOR SUBSEQUENT ANNUAL WELLNESS VISIT IN MEDICARE PATIENT: Primary | ICD-10-CM

## 2025-05-28 DIAGNOSIS — M25.551 BILATERAL HIP PAIN: ICD-10-CM

## 2025-05-28 DIAGNOSIS — G89.29 CHRONIC BILATERAL LOW BACK PAIN WITHOUT SCIATICA: ICD-10-CM

## 2025-05-28 DIAGNOSIS — M25.552 BILATERAL HIP PAIN: ICD-10-CM

## 2025-05-28 DIAGNOSIS — I10 BENIGN ESSENTIAL HTN: ICD-10-CM

## 2025-05-28 DIAGNOSIS — E66.812 CLASS 2 OBESITY DUE TO EXCESS CALORIES WITHOUT SERIOUS COMORBIDITY WITH BODY MASS INDEX (BMI) OF 35.0 TO 35.9 IN ADULT: ICD-10-CM

## 2025-05-28 DIAGNOSIS — E11.42 DIABETIC POLYNEUROPATHY ASSOCIATED WITH TYPE 2 DIABETES MELLITUS: ICD-10-CM

## 2025-05-28 ASSESSMENT — ACTIVITIES OF DAILY LIVING (ADL)
DOING_HOUSEWORK: INDEPENDENT
GROCERY_SHOPPING: INDEPENDENT
MANAGING_FINANCES: INDEPENDENT
DRESSING: INDEPENDENT
TAKING_MEDICATION: INDEPENDENT
BATHING: INDEPENDENT

## 2025-05-28 ASSESSMENT — ANXIETY QUESTIONNAIRES
4. TROUBLE RELAXING: NOT AT ALL
6. BECOMING EASILY ANNOYED OR IRRITABLE: NOT AT ALL
GAD7 TOTAL SCORE: 0
1. FEELING NERVOUS, ANXIOUS, OR ON EDGE: NOT AT ALL
3. WORRYING TOO MUCH ABOUT DIFFERENT THINGS: NOT AT ALL
7. FEELING AFRAID AS IF SOMETHING AWFUL MIGHT HAPPEN: NOT AT ALL
IF YOU CHECKED OFF ANY PROBLEMS ON THIS QUESTIONNAIRE, HOW DIFFICULT HAVE THESE PROBLEMS MADE IT FOR YOU TO DO YOUR WORK, TAKE CARE OF THINGS AT HOME, OR GET ALONG WITH OTHER PEOPLE: NOT DIFFICULT AT ALL
5. BEING SO RESTLESS THAT IT IS HARD TO SIT STILL: NOT AT ALL
2. NOT BEING ABLE TO STOP OR CONTROL WORRYING: NOT AT ALL

## 2025-05-28 ASSESSMENT — ENCOUNTER SYMPTOMS
OCCASIONAL FEELINGS OF UNSTEADINESS: 0
LOSS OF SENSATION IN FEET: 0
DEPRESSION: 0

## 2025-05-28 ASSESSMENT — PATIENT HEALTH QUESTIONNAIRE - PHQ9
2. FEELING DOWN, DEPRESSED OR HOPELESS: NOT AT ALL
1. LITTLE INTEREST OR PLEASURE IN DOING THINGS: NOT AT ALL
SUM OF ALL RESPONSES TO PHQ9 QUESTIONS 1 & 2: 0

## 2025-05-28 NOTE — PROGRESS NOTES
"Subjective   Patient ID: Dixon Pino is a 65 y.o. male who presents for Medicare Annual Wellness Visit . I last saw the patient on 2/25/2025  .     HPI  Patient has no refill requests for rooming MA.    Patient has bilateral hip pain and pain across the lower back that he would like it to be checked. He does note that his front foot turn outwards. He denies weakness/ numbness/ perianal anesthesia/bowel and bladder incontinence.     Past medical, surgical, and family history reviewed.  Reviewed and documented all medications   Pt eating well, exercising as tolerated and taking medications as directed.      Review of Systems  Except positives as noted in the CC & HPI      Constitutional: Denies fevers, chills, night sweats, fatigue, weight changes, change in appetite    Eyes: Denies blurry vision, double vision    ENT: Denies otalgia, trouble hearing, tinnitus, vertigo, nasal congestion, rhinorrhea, sore throat    Neck: Denies swelling, masses    Cardiovascular: Denies chest pain, palpitations, edema, orthopnea, syncope    Respiratory: Denies dyspnea, cough, wheezing, postural nocturnal dyspnea    Gastrointestinal: Denies abdominal pain, nausea, vomiting, diarrhea, constipation, melena, hematochezia    Genitourinary: Denies dysuria, hematuria  Musculoskeletal: Denies back pain, neck pain, arthralgias, myalgias    Integumentary: Denies skin lesions, rashes, masses    Neurological: Denies dizziness, headaches, confusion, limb weakness, paresthesias, syncope, convulsions    Psychiatric: Denies depression, anxiety, homicidal ideations, suicidal ideations, sleep disturbances    Endocrine: Denies polyphagia, polydipsia, polyuria, weakness, hair thinning, heat intolerance, cold intolerance, weight changes    Heme/Lymph: Denies easy bruising, easy bleeding, swollen glands    Objective   Visit Vitals  /62   Pulse 75   Temp 36.4 °C (97.6 °F)   Resp 16   Ht 1.651 m (5' 5\")   Wt 95.4 kg (210 lb 6.4 oz)   SpO2 99%   BMI " 35.01 kg/m²   Smoking Status Former   BSA 2.09 m²       Physical Exam    General Appearance - well-developed, well-nourished, 64 y.o., White male in no acute distress.      Skin - warm, pink and dry without rash or concerning lesions.      Mental Status - alert and oriented x 3. Normal mood and affect appropriate to mood.      Neck - supple without lymphadenopathy. Carotid pulses are normal without bruits. Thyroid is normal in midline without nodules.      Chest - lungs are clear to auscultation without rales, rhonchi or wheezes.      Heart - regular, rate and rhythm without murmurs, rubs or gallops.     Abdomen - soft, obese, protuberant, nontender, nondistended. No masses, hepatomegaly or splenomegaly is noted. No rebound, rigidity or guarding is noted. Bowel sounds are normoactive. Diastasis recti noted.     Back - Lumbar spine reveals normal curvature. Range of motion reveals flexion to 90°, extension to 30° with normal lateral bending and normal twisting. Pain is noted with flexion, extension, lateral bending to the left and right and with twisting to the right and left. Straight leg raises were negative bilaterally. DTRs were 2+ normal at the knees and ankles bilaterally and symmetrically. Motor strength is 5/5 at the lower extremities with normal toe and heel walking.     Hip -  normal straight leg raising except for tight hamstrings. 35 ° of external range of motion and -10 ° of internal range of motion of right hip. 35 ° of external range of motion and 0 ° of internal range of motion of motion of left hip. No pain to palpation of the inguinal crease.      Extremities - no cyanosis, clubbing or edema. Pedal pulses are 2+ normal at the dorsalis pedis and posterior pulses bilaterally.      Neurological - cranial nerves II through XII are grossly intact. Motor strength 5/5 at all fours.    Results  Reviewed thoracic spine xrays results from 03/2025.      Assessment   1. Encounter for subsequent annual wellness  visit in Medicare patient        2. Type 2 diabetes mellitus without complication, without long-term current use of insulin  Hemoglobin A1C    Hemoglobin A1C    Hemoglobin A1C      3. Diabetic polyneuropathy associated with type 2 diabetes mellitus        4. Benign essential HTN  CBC and Auto Differential    Comprehensive Metabolic Panel      5. Chronic bilateral low back pain without sciatica  XR hips bilateral 3 or 4 VW w pelvis when performed      6. Bilateral hip pain  XR hips bilateral 3 or 4 VW w pelvis when performed      7. Pure hypercholesterolemia  Lipid Panel      8. Screening PSA (prostate specific antigen)  Prostate Specific Antigen, Screen      9. Class 2 obesity due to excess calories without serious comorbidity with body mass index (BMI) of 35.0 to 35.9 in adult          Patient to continue current medications (with any exceptions as noted) and diet. Follow-up in 3 month(s) otherwise as needed.      He will obtain a HgbA1c this week.    Patient is to return for fasting CBC and Auto Differential, Comprehensive Metabolic Panel, Lipid profile, Hemoglobin A1c, PSA labs at their convenience prior to their next appointment. Fasting is nothing to eat or drink except water or black coffee for 8-12 hours. Will call patient with results when available.      Ordered XR hips bilateral 3 or 4 VW w pelvis when performed to evaluate bilateral hip pain. Will call patient with results when available.       Scribe Attestation  By signing my name below, IThao Scribe   attest that this documentation has been prepared under the direction and in the presence of Ryan Valdez MD.      This note has been transcribed using a medical scribe and there is a possibility of unintentional typing misprints.     All medical record entries made by the scribe were at my direction and personally dictated by me. I have reviewed the chart and agree that the record accurately reflects my personal performance of the  history, physical exam, discussion, and plan.     ABIEL NICHOLSON M.D.

## 2025-05-28 NOTE — PROGRESS NOTES
"Subjective   Reason for Visit: Dixon Pino is an 65 y.o. male here for a Medicare Wellness visit.     Past Medical, Surgical, and Family History reviewed and updated in chart.    Reviewed all medications by prescribing practitioner or clinical pharmacist (such as prescriptions, OTCs, herbal therapies and supplements) and documented in the medical record.    HPI    Patient Care Team:  Ryan Valdez MD as PCP - General (Family Medicine)  Ryan Valdez MD as PCP - [employer] PCP     Review of Systems    Objective   Vitals:  /62   Pulse 75   Temp 36.4 °C (97.6 °F)   Resp 16   Ht 1.651 m (5' 5\")   Wt 95.4 kg (210 lb 6.4 oz)   SpO2 99%   BMI 35.01 kg/m²       Physical Exam    Assessment & Plan  Encounter for subsequent annual wellness visit in Medicare patient         Type 2 diabetes mellitus without complication, without long-term current use of insulin    Orders:    Hemoglobin A1C; Future    Hemoglobin A1C; Future    Diabetic polyneuropathy associated with type 2 diabetes mellitus         Benign essential HTN    Orders:    CBC and Auto Differential; Future    Comprehensive Metabolic Panel; Future    Chronic bilateral low back pain without sciatica    Orders:    XR hips bilateral 3 or 4 VW w pelvis when performed; Future    Bilateral hip pain    Orders:    XR hips bilateral 3 or 4 VW w pelvis when performed; Future    Pure hypercholesterolemia    Orders:    Lipid Panel; Future    Screening PSA (prostate specific antigen)    Orders:    Prostate Specific Antigen, Screen; Future    Class 2 obesity due to excess calories without serious comorbidity with body mass index (BMI) of 35.0 to 35.9 in adult                   "

## 2025-05-30 ENCOUNTER — HOSPITAL ENCOUNTER (OUTPATIENT)
Dept: RADIOLOGY | Facility: HOSPITAL | Age: 65
Discharge: HOME | End: 2025-05-30
Payer: MEDICARE

## 2025-05-30 DIAGNOSIS — M25.551 BILATERAL HIP PAIN: ICD-10-CM

## 2025-05-30 DIAGNOSIS — M54.50 CHRONIC BILATERAL LOW BACK PAIN WITHOUT SCIATICA: ICD-10-CM

## 2025-05-30 DIAGNOSIS — M25.552 BILATERAL HIP PAIN: ICD-10-CM

## 2025-05-30 DIAGNOSIS — G89.29 CHRONIC BILATERAL LOW BACK PAIN WITHOUT SCIATICA: ICD-10-CM

## 2025-05-30 PROCEDURE — 73521 X-RAY EXAM HIPS BI 2 VIEWS: CPT

## 2025-06-01 NOTE — RESULT ENCOUNTER NOTE
Please call the patient regarding his abnormal result.  X-rays of the hips reveal mild to moderate arthritis bilaterally.  Recommend referral to PT for further evaluation and treatment.

## 2025-06-02 DIAGNOSIS — M16.0 ARTHRITIS OF BOTH HIPS: Primary | ICD-10-CM

## 2025-06-24 ENCOUNTER — EVALUATION (OUTPATIENT)
Dept: PHYSICAL THERAPY | Facility: CLINIC | Age: 65
End: 2025-06-24
Payer: MEDICARE

## 2025-06-24 DIAGNOSIS — M16.0 ARTHRITIS OF BOTH HIPS: ICD-10-CM

## 2025-06-24 PROCEDURE — 97161 PT EVAL LOW COMPLEX 20 MIN: CPT | Mod: GP

## 2025-06-24 PROCEDURE — 97110 THERAPEUTIC EXERCISES: CPT | Mod: GP

## 2025-06-24 ASSESSMENT — ENCOUNTER SYMPTOMS
DEPRESSION: 0
OCCASIONAL FEELINGS OF UNSTEADINESS: 0
LOSS OF SENSATION IN FEET: 1

## 2025-06-24 NOTE — PROGRESS NOTES
"Patient Name: Dixon Pino  MRN: [id number]  Time Calculation  Start Time: 1400  Stop Time: 1442  Time Calculation (min): 42 min  PT Evaluation Time Entry  PT Evaluation (Low) Time Entry: 30  PT Therapeutic Procedures Time Entry  Therapeutic Exercise Time Entry: 12          Current Problem  1. Arthritis of both hips  Referral to Physical Therapy    Follow Up In Physical Therapy        Insurance    Visit #1  [employer] BO[employer] COPAY 30 DED (MET)  COVERAGE 100 OOP 4150(158) AUTH REQ# [id number]  1 VISIT 25 THRU 25 RF# [id number]       Subjective     General: Pt is a 65 y.o male presenting to clinic with c/o bilateral hip and lower back pain. 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 Notes getting shoes/socks on is the most bothersome  States R leg has always turned out. States lower back and lateral hip pain that has been bothering for the past 4-5 years that is worsened in the past month  Notes hips feel \"locked and tight\". Occasional numbness/tingling into legs when laying down on back or side. Notes silver sneakers 2-3x/week, playing golf, daily walking 1-2 miles with dog. Golfs 1x/week in [place]. Denies s/s of cauda equina. Currently having difficulty with putting shoes/socks on and prolonged postures. Main goal with therapy at this time is to increase mobility of hips and back and decrease pain.    Walking tolerance: 5-10 minutes  Standing tolerance: 5-10 minutes  Sitting tolerance: 60+ minutes    PMHx: T2DM, HTN    Home Set-up: [place] with basement  Stair Negotiation: Step-to ascent/descent    Pain:  Current:  4/10  High: 8/10  Low: 2-3/10  Av-3/10     Aggravating Factors: Laying down, repetitive bending, prolonged postures  Alleviating Factors: Rest, warm shower    Reviewed medical screening form with pt and medical screening assessed    Imaging:   X-Ray pelvis 25:  \"FINDINGS:  Bilateral hips, four views      There is mild-to-moderate " "joint space narrowing with osteophytosis  and sclerosis in the hips bilaterally. There is no fracture. There is  no dislocation\"  Objective     Spine Musculoskeletal Exam    Gait    Antalgic: right and left    Limp: left    Right      Heel walk: able to heel walk      Toe walk: able to toe walk    Left      Heel walk: able to heel walk      Toe walk: able to toe walk    Palpation    Thoracolumbar    Tenderness: present      PSIS: right and left      SI Joint: right and left      Spinous process: lower lumbar    Right      Muscle tone: increased    Left      Muscle tone: increased    Range of Motion    Thoracolumbar       Flexion: normal. Flexion detail: no pain.     Extension: 75%. Extension detail: pain and guarding.       Right      Lateral bendin%. Lateral bending detail: pain and guarding.       Lateral rotation: 75%. Lateral rotation detail: pain and guarding.       Left      Lateral bendin%. Lateral bending detail: pain and guarding.       Lateral rotation: 75%. Lateral rotation detail: pain and guarding.      Strength    Thoracolumbar       Right      Extensor hallucis longus: 4+/5.       Tibialis anterior: 4+/5.       Tibialis posterior: 4+/5.       Plantar flexion: 4+/5.       Peroneals: 4+/5.       Quadriceps: 4+/5.       Hamstrin-/5.       Hip abductors: 4-/5.       Hip flexion: 4-/5. Hip flexion is affected by pain.       Hip adduction: 4-/5.        Left      Extensor hallucis longus: 4+/5.       Tibialis anterior: 4+/5.       Tibialis posterior: 4+/5.       Plantar flexion: 4+/5.       Peroneals: 4+/5.       Quadriceps: 4+/5.       Hamstrin-/5.       Hip abductors: 4-/5. Hip abductors are affected by pain.       Hip flexion: 4-/5. Hip flexion is affected by pain.       Hip adduction: 4-/5.      Sensory    Thoracolumbar    Thoracolumbar sensation is normal.    Reflexes    Thoracolumbar reflexes are normal.    Neurovascular    Thoracolumbar    Thoracolumbar neurovascular exam is " normal.    Special Tests    Thoracolumbar      Right      TAWANNA test: positive      SLR: no back or leg pain      Left      TAWANNA test: positive      SLR: no back or leg pain     Hip Musculoskeletal Exam  Gait    Antalgic: right and left    Limp: left    Palpation    Right      Tenderness: present        Greater trochanteric region pain: mild    Left      Tenderness: present        Greater trochanteric region pain: mild    Range of Motion    Right      Active ROM: abnormal and pain.        Passive ROM: abnormal and pain.        Active internal rotation: 10.       Active abduction: 30.     Left      Active ROM: abnormal and pain.        Passive ROM: abnormal and pain.        Active internal rotation: 10.       Active abduction: 30.     Strength    Right      Extension: 4-/5.       Flexion: 4-/5. Flexion is affected by pain.       Internal rotation: 4-/5.       External rotation: 4-/5.       Adduction: 4-/5.       Abduction: 4-/5.     Left      Extension: 4-/5.       Flexion: 4-/5. Flexion is affected by pain.       Internal rotation: 4-/5.       External rotation: 4-/5.       Adduction: 4-/5.       Abduction: 4-/5. Abduction is affected by pain.     Neurovascular    Right        Right hip neurovascular exam is normal.    Left        Left hip neurovascular exam is normal.    Special Tests    Right      TAWANNA test (right): positive      Impingement test: positive    Left      TAWANNA test (left): positive      Impingement test: positive    Special tests additional comments: José test R/L:+/+  Obers Test R/L:+/+         Outcome Measures:  LEFS:  52/80     Treatment: See HEP below  Seated cat cow with diaphragmatic breathing x10  Seated clamshell vs GTB x12 3 sec hold  STS GTB x12  Fwd Lunge with blue TB lateral hip distraction x10 5 sec end range hold ea    EDUCATION/HEP:  Pt educated on the importance of controlled mobility of the spine along with lumbopelvic stability and strength to minimize stress on lower back. He  was further educated on the importance of hip mobility to minimize lower back tension. He was also instructed on the benefit of walking in tolerable intervals or use of aquatics to benefit hip and lower back if desired. Pt was educated on POC, expected timeline and interventions. Pt acknowledged good understanding and was in agreement to all above information.      Assessment:   Pt is a 65 y.o male presenting with the following deficits:  decreased trunk and guarded ROM along with decreased and painful hip impingement and OA related testing. On examination pt demonstrates painful STS and initial steps especially L>R and demonstrates decreased step length and stride length overall s/t to deep hip joint. These deficits have lead to functional impairments with  prolonged sitting/standing/walking/stairs/driving/sleeping/home and yard care/self care/participation in leisure activities. Recommend skilled PT to address the aforementioned deficits and allow pt to restore PLOF and maximize functional capacity. Anticipate fair to good prognosis given acute on chronic nature of condition, progressive nature, current level of function, age, attitude towards therapy, support system. Patient would benefit from PT services to address current impairments and facilitate improvement in current activity limits. Educated patient on current POC, initial HEP and current examination findings. Patient verbalized understanding of all education and instruction provided today.      Clinical Presentation: Stable     Level of Complexity: Low     Goals:    Patient will be independent with HEP.    Pt will improve LEFS score by at least 9 points (MCID) to indicate significant improvement in functional abilities.     Patient will improve hip extension strength to >/=4+/5 for improved lumbopelvic stability with ambulation and ADL performance.    Patient will improve hip abduction strength to >/4+/5 for improved proximal hip stability and SL  stability with ambulation.    Patient will demonstrate trunk AROM that is WNL and painless in all planes.    Patient will report </= 1/10 back/hip pain with ADL performance.      Plan  1x/week for 8 visits     Planned interventions include: aquatic therapy, biofeedback, cryotherapy, dry needling, edema control, education/instruction, electrical stimulation, gait training, home program, hot pack, kinesiotaping, manual therapy, neuromuscular re-education, self care/home management, therapeutic activities, therapeutic exercises, ultrasound and vasopneumatic device w/ cold.

## 2025-06-26 ENCOUNTER — TELEPHONE (OUTPATIENT)
Dept: PHYSICAL THERAPY | Facility: CLINIC | Age: 65
End: 2025-06-26
Payer: MEDICARE

## 2025-07-02 ENCOUNTER — TREATMENT (OUTPATIENT)
Dept: PHYSICAL THERAPY | Facility: CLINIC | Age: 65
End: 2025-07-02
Payer: MEDICARE

## 2025-07-02 DIAGNOSIS — M16.0 ARTHRITIS OF BOTH HIPS: ICD-10-CM

## 2025-07-02 PROCEDURE — 97140 MANUAL THERAPY 1/> REGIONS: CPT | Mod: GP,CQ

## 2025-07-02 PROCEDURE — 97110 THERAPEUTIC EXERCISES: CPT | Mod: GP,CQ

## 2025-07-02 NOTE — PROGRESS NOTES
Physical Therapy Treatment    Patient Name: Dixon Pino  MRN: [id number]  Today's Date: 7/2/2025  Time Calculation  Start Time: 1430  Stop Time: 1508  Time Calculation (min): 38 min  PT Therapeutic Procedures Time Entry  Therapeutic Exercise Time Entry: 30  Manual Therapy Time Entry: 8       Insurance     Visit #2/8  [employer] COPAY 30 DED (MET)  COVERAGE 100 OOP 4150(158) AUTH REQ# [id number]  1 VISIT 6-24-25 THRU 7-8-25 # [id number]     Current Problem  1. Arthritis of both hips  Follow Up In Physical Therapy          Subjective   General   Pt. Reports he is doing well w/ some soreness still.   Precautions     Pain       Objective   Treatments:  Supine marches GTB x15ea  Seated clamshell vs GTB x12 3 sec hold  STS GTB x12  Fwd Lunge with blue TB lateral hip distraction x10 5 sec end range hold ea    Manual: B hip Lateral distraction w/ gait belt - 8'    Assessment:   Pt. Progressing w/ all exercises fairly. Added supine marches w/ GTB for improving B LE strength w/ good pt. Tolerance. Pt. Stated he likes doing SKTC for a stretch every morning and tends to feel a little more loose after. Pt. Did have some increased soreness after manual lateral hip distraction today w/o much relief after. Pt. Still feeling the best w/ fwd lunges w/ BTB, but seemed to fatigue a little quicker today. Pt. Will continue w/ current HEP.     Plan:   Continue to increase strength/mobility for performing ADLs.     OP EDUCATION:       Goals:

## 2025-07-09 ENCOUNTER — TREATMENT (OUTPATIENT)
Dept: PHYSICAL THERAPY | Facility: CLINIC | Age: 65
End: 2025-07-09
Payer: MEDICARE

## 2025-07-09 DIAGNOSIS — M16.0 ARTHRITIS OF BOTH HIPS: ICD-10-CM

## 2025-07-09 PROCEDURE — 97140 MANUAL THERAPY 1/> REGIONS: CPT | Mod: GP

## 2025-07-09 PROCEDURE — 97110 THERAPEUTIC EXERCISES: CPT | Mod: GP

## 2025-07-09 NOTE — PROGRESS NOTES
"Physical Therapy Treatment    Patient Name: Dixon Pino  MRN: [id number]  Today's Date: 7/9/2025  Time Calculation  Start Time: 1400  Stop Time: 1441  Time Calculation (min): 41 min  PT Therapeutic Procedures Time Entry  Therapeutic Exercise Time Entry: 26  Manual Therapy Time Entry: 15       Insurance     Visit #3/8  [employer] BO[employer] COPAY 30 DED (MET)  COVERAGE 100 OOP 4150(158) AUTH REQ# [id number]  1 VISIT 6-24-25 THRU 7-8-25 # [id number]     Current Problem  1. Arthritis of both hips  Follow Up In Physical Therapy          Subjective   General   Pt. Reports he is doing well w/ only tightness and slight ache reported this date. He states no change in activity since last visit but consistency with HEP. He notes no new concerns since last visit.    Pain   0-1/10    Objective   Treatments:  Therapeutic Exercise 17600:  Khari - 5'  Supine marches GTB x15ea  Seated clamshell vs GTB 2x12 3 sec hold  STS GTB 2x12  Fwd Lunge with blue TB lateral hip distraction 2 x10 5 sec end range hold ea side  Modified shotgun technique with ball squeeze and Gtb x3 rounds (added to HEP)  Modified range captain janet x10 ea side    Manual Therapy 41668  B hip Lateral distraction w/ gait belt   MWM distraction with active hip flexion 2x10  MWM ditraction with active hip IR 2x10  Shotgun technique x2    Assessment:   Pt. Progressing w/ all exercises fairly. He demonstrated improved stability and less antalgic gait after manual therapy particularly shotgun technique this date. He stated \"feeling his muscles engage more\" after shotgun technique. Pt. Will continue w/ current HEP and new addition of modified shotgun technique. Pt will continue to benefit from therapy to improve piter hip ROM and global strength/stability.    Plan:   Continue to increase strength/mobility for performing ADLs.     OP EDUCATION:   Pt encouraged to continue with current HEP and new modification. He was educated on the importance of hip mobility prior to " active engagement with exercises with the use of lunge exercise prior to other HEP.Pt acknowledged good understanding and was in agreement to all above information.      Goals:

## 2025-07-15 NOTE — PROGRESS NOTES
Physical Therapy Treatment    Patient Name: Dixon Pino  MRN: [id number]  Today's Date: 7/16/2025  Time Calculation  Start Time: 0204  Stop Time: 0244  Time Calculation (min): 40 min     PT Therapeutic Procedures Time Entry  Therapeutic Exercise Time Entry: 30  Manual Therapy Time Entry: 8                 Current Problem  1. Arthritis of both hips  Follow Up In Physical Therapy          Insurance:  Visit #4/8  [employer] COPAY 30 DED (MET)  COVERAGE 100 OOP 4150(158) AUTH REQ# [id number]  1 VISIT 6-24-25 THRU 7-8-25 RF# [id number]   Precautions       Subjective   Subjective:   Pt reports that his back and hips aren't feeling too bad. He has some discomfort, from putting up a privacy fence.   Pain   3/10    Objective   Treatments:  Therapeutic Exercise 10266:  Khari - 5'  Supine marches GTB x20ea  Seated clamshell vs GTB 2x12 3 sec hold  STS GTB 2x12  Standing hip ext/abd B 10x ea  Fwd Lunge with blue TB lateral hip distraction 2 x10 5 sec end range hold ea side  Modified range captain janet x10 ea side---     Manual Therapy [id number]  B hip Lateral distraction w/ gait belt   MWM distraction with active hip flexion 2x10-  MWM ditraction with active hip IR 2x10  Shotgun technique x1  OP EDUCATION:   Sit to stands, clamshells, standing hip abd      Assessment:   Pt developed foot cramping following supine marching. Did well with sit to stands, as good posture used. Added standing hip abd and ext for more hip strengthening. Despite cues given, pt had difficulty with these to not lean fwd against table while posteriorly shifting with the pelvis. Pt able to get low with lunges, however fatigued with lunges.   Plan:   Cont to increase core stability and B LE strength

## 2025-07-16 ENCOUNTER — TREATMENT (OUTPATIENT)
Dept: PHYSICAL THERAPY | Facility: CLINIC | Age: 65
End: 2025-07-16
Payer: MEDICARE

## 2025-07-16 DIAGNOSIS — M16.0 ARTHRITIS OF BOTH HIPS: Primary | ICD-10-CM

## 2025-07-16 PROCEDURE — 97110 THERAPEUTIC EXERCISES: CPT | Mod: GP,CQ

## 2025-07-16 PROCEDURE — 97140 MANUAL THERAPY 1/> REGIONS: CPT | Mod: GP,CQ

## 2025-07-23 ENCOUNTER — TREATMENT (OUTPATIENT)
Dept: PHYSICAL THERAPY | Facility: CLINIC | Age: 65
End: 2025-07-23
Payer: MEDICARE

## 2025-07-23 DIAGNOSIS — M16.0 ARTHRITIS OF BOTH HIPS: ICD-10-CM

## 2025-07-23 PROCEDURE — 97140 MANUAL THERAPY 1/> REGIONS: CPT | Mod: GP

## 2025-07-23 PROCEDURE — 97110 THERAPEUTIC EXERCISES: CPT | Mod: GP

## 2025-07-23 NOTE — PROGRESS NOTES
Physical Therapy Treatment    Patient Name: Dixon Pino  MRN: [id number]  Today's Date: 7/23/2025  Time Calculation  Start Time: 1402  Stop Time: 1444  Time Calculation (min): 42 min     PT Therapeutic Procedures Time Entry  Therapeutic Exercise Time Entry: 30  Manual Therapy Time Entry: 12                 Current Problem  1. Arthritis of both hips  Follow Up In Physical Therapy          Insurance:  Visit #5/8  [employer] COPAY 30 DED (MET)  COVERAGE 100 OOP 4150(158) AUTH REQ# [id number]  1 VISIT 6-24-25 THRU 7-8-25 RF# [id number]   Precautions       Subjective   Subjective:   Pt reports that his back and hips are feeling good lately as he has been walking more frequently. He notes consistency with HEP and denies falls. No other concerns mentioned this date.    Pain   1/10    Objective   Treatments:  Therapeutic Exercise 31347:  Khari - 6'  Supine marches GTB 3 sec eccentric tempo 2x10 ea side  Seated clamshell vs GTB 2x12 3 sec hold  STS GTB 2x12  3 sec eccentric tempo  Standing hip ext/abd B vs YTB 2x10 ea  3 sec eccentric tempo  Fwd Lunge with blue TB lateral hip distraction 2 x10 5 sec end range hold ea side       Manual Therapy 52863  B hip Lateral distraction w/ gait belt   MWM ditraction with active hip IR 2x10  Shotgun technique x1    OP EDUCATION:   Pt encouraged to continue with current HEP and walk longer intervals as tolerated. He was encouraged to utilize aquatics whenever able to promote less weightbearing and strengthening. Pt acknowledged good understanding and was in agreement to all above information.      Assessment:   Pt continued to do well with sit to stands, as good posture used. Pt continued to benefit from lunges and global hip strengthening exercises. He required frequent rests this date s/t to R foot cramps that dissipated rather quickly once rested. He required less cueing this date and demonstrated good form with minimal to no compensations. Added eccentric portion and cueing to  all exercises to improve muscular control and endurance. Pt will continue to benefit from therapy improving global core and gluteal strength and mobility.    Plan:   Cont to increase core stability and B LE strength

## 2025-07-29 NOTE — PROGRESS NOTES
"Physical Therapy Treatment    Patient Name: Dixon Pino  MRN: [id number]  Today's Date: 7/30/2025  Time Calculation  Start Time: 0203  Stop Time: 0246  Time Calculation (min): 43 min     PT Therapeutic Procedures Time Entry  Therapeutic Exercise Time Entry: 30  Neuromuscular Re-Education Time Entry: 10                 Current Problem  1. Arthritis of both hips  Follow Up In Physical Therapy          Insurance:  Visit #6/8  [employer] COPAY 30 DED (MET)  COVERAGE 100 OOP 4150(158) AUTH REQ# [id number]  1 VISIT 6-24-25 THRU 7-8-25 RF# [id number]   Precautions   none    Subjective   Subjective:   Pt reports that both of his hips are still sore and achy along the sides, however, he thinks they are getting better.   Pain   0/10    Objective   Treatments:  Therapeutic Exercise 03928:  Khari - 5'  Bridges w/ GTB 10x  Supine marches GTB 3 sec eccentric tempo 2x10 ea side  Supine heel walkouts 10x  Supine SLR B 10x ea  LTR 10x  Supine hamstring stretch 2x30\"  Seated clamshell vs GTB 2x12 3 sec hold  STS GTB 2x12  3 sec eccentric tempo  Standing hip ext/abd B vs YTB 2x10 ea  3 sec eccentric tempo  Fwd Lunge with blue TB lateral hip distraction 2 x10 5 sec end range hold ea side--        Not Today:  Manual Therapy 40584  B hip Lateral distraction w/ gait belt   MWM ditraction with active hip IR 2x10  Shotgun technique x1  OP EDUCATION:   SLRs, heelwalkouts      Assessment:   Introduced bridges, which pt felt some strain in his B hamstrings and a little pain in the mid back. Introduced heel walkouts for more core stabilization. Pt unable to get the knees together for LTR. Pt displayed some weakness with SLRs. Pt did well with sit to stands. Had tendency to lean a little with standing hip abd    Plan:   Increase B LE and core strength.              "

## 2025-07-30 ENCOUNTER — TREATMENT (OUTPATIENT)
Dept: PHYSICAL THERAPY | Facility: CLINIC | Age: 65
End: 2025-07-30
Payer: MEDICARE

## 2025-07-30 DIAGNOSIS — M16.0 ARTHRITIS OF BOTH HIPS: Primary | ICD-10-CM

## 2025-07-30 PROCEDURE — 97112 NEUROMUSCULAR REEDUCATION: CPT | Mod: GP,CQ

## 2025-07-30 PROCEDURE — 97110 THERAPEUTIC EXERCISES: CPT | Mod: GP,CQ

## 2025-08-06 ENCOUNTER — APPOINTMENT (OUTPATIENT)
Dept: PHYSICAL THERAPY | Facility: CLINIC | Age: 65
End: 2025-08-06
Payer: MEDICARE

## 2025-08-13 ENCOUNTER — TREATMENT (OUTPATIENT)
Dept: PHYSICAL THERAPY | Facility: CLINIC | Age: 65
End: 2025-08-13
Payer: MEDICARE

## 2025-08-13 DIAGNOSIS — M16.0 ARTHRITIS OF BOTH HIPS: ICD-10-CM

## 2025-08-13 PROCEDURE — 97110 THERAPEUTIC EXERCISES: CPT | Mod: GP

## 2025-08-15 LAB
EST. AVERAGE GLUCOSE BLD GHB EST-MCNC: 157 MG/DL
EST. AVERAGE GLUCOSE BLD GHB EST-SCNC: 8.7 MMOL/L
HBA1C MFR BLD: 7.1 %

## 2025-08-21 ENCOUNTER — APPOINTMENT (OUTPATIENT)
Dept: PRIMARY CARE | Facility: CLINIC | Age: 65
End: 2025-08-21
Payer: MEDICARE

## 2025-08-21 VITALS
RESPIRATION RATE: 16 BRPM | SYSTOLIC BLOOD PRESSURE: 114 MMHG | BODY MASS INDEX: 34.59 KG/M2 | HEART RATE: 87 BPM | DIASTOLIC BLOOD PRESSURE: 60 MMHG | OXYGEN SATURATION: 98 % | WEIGHT: 207.6 LBS | HEIGHT: 65 IN | TEMPERATURE: 97.6 F

## 2025-08-21 DIAGNOSIS — R91.8 MULTIPLE LUNG NODULES: ICD-10-CM

## 2025-08-21 DIAGNOSIS — E11.9 TYPE 2 DIABETES MELLITUS WITHOUT COMPLICATION, WITHOUT LONG-TERM CURRENT USE OF INSULIN: ICD-10-CM

## 2025-08-21 DIAGNOSIS — E66.811 CLASS 1 OBESITY DUE TO EXCESS CALORIES WITHOUT SERIOUS COMORBIDITY WITH BODY MASS INDEX (BMI) OF 34.0 TO 34.9 IN ADULT: ICD-10-CM

## 2025-08-21 DIAGNOSIS — E66.09 CLASS 1 OBESITY DUE TO EXCESS CALORIES WITHOUT SERIOUS COMORBIDITY WITH BODY MASS INDEX (BMI) OF 34.0 TO 34.9 IN ADULT: ICD-10-CM

## 2025-08-21 DIAGNOSIS — M54.50 CHRONIC BILATERAL LOW BACK PAIN WITHOUT SCIATICA: ICD-10-CM

## 2025-08-21 DIAGNOSIS — G89.29 CHRONIC BILATERAL LOW BACK PAIN WITHOUT SCIATICA: ICD-10-CM

## 2025-08-21 DIAGNOSIS — I10 BENIGN ESSENTIAL HTN: Primary | ICD-10-CM

## 2025-08-21 DIAGNOSIS — E11.42 DIABETIC POLYNEUROPATHY ASSOCIATED WITH TYPE 2 DIABETES MELLITUS: ICD-10-CM

## 2025-08-21 DIAGNOSIS — F17.211 CIGARETTE NICOTINE DEPENDENCE IN REMISSION: ICD-10-CM

## 2025-08-21 PROCEDURE — 4010F ACE/ARB THERAPY RXD/TAKEN: CPT | Performed by: FAMILY MEDICINE

## 2025-08-21 PROCEDURE — 99214 OFFICE O/P EST MOD 30 MIN: CPT | Performed by: FAMILY MEDICINE

## 2025-08-21 PROCEDURE — 3078F DIAST BP <80 MM HG: CPT | Performed by: FAMILY MEDICINE

## 2025-08-21 PROCEDURE — G2211 COMPLEX E/M VISIT ADD ON: HCPCS | Performed by: FAMILY MEDICINE

## 2025-08-21 PROCEDURE — 1159F MED LIST DOCD IN RCRD: CPT | Performed by: FAMILY MEDICINE

## 2025-08-21 PROCEDURE — 3008F BODY MASS INDEX DOCD: CPT | Performed by: FAMILY MEDICINE

## 2025-08-21 PROCEDURE — 3051F HG A1C>EQUAL 7.0%<8.0%: CPT | Performed by: FAMILY MEDICINE

## 2025-08-21 PROCEDURE — 3074F SYST BP LT 130 MM HG: CPT | Performed by: FAMILY MEDICINE

## 2025-08-21 RX ORDER — METFORMIN HYDROCHLORIDE 500 MG/1
1000 TABLET, EXTENDED RELEASE ORAL
Qty: 180 TABLET | Refills: 3 | Status: SHIPPED | OUTPATIENT
Start: 2025-08-21 | End: 2026-08-21

## 2025-08-21 ASSESSMENT — ANXIETY QUESTIONNAIRES
3. WORRYING TOO MUCH ABOUT DIFFERENT THINGS: NOT AT ALL
2. NOT BEING ABLE TO STOP OR CONTROL WORRYING: NOT AT ALL
4. TROUBLE RELAXING: NOT AT ALL
6. BECOMING EASILY ANNOYED OR IRRITABLE: NOT AT ALL
7. FEELING AFRAID AS IF SOMETHING AWFUL MIGHT HAPPEN: NOT AT ALL
GAD7 TOTAL SCORE: 0
IF YOU CHECKED OFF ANY PROBLEMS ON THIS QUESTIONNAIRE, HOW DIFFICULT HAVE THESE PROBLEMS MADE IT FOR YOU TO DO YOUR WORK, TAKE CARE OF THINGS AT HOME, OR GET ALONG WITH OTHER PEOPLE: NOT DIFFICULT AT ALL
5. BEING SO RESTLESS THAT IT IS HARD TO SIT STILL: NOT AT ALL
1. FEELING NERVOUS, ANXIOUS, OR ON EDGE: NOT AT ALL

## 2025-08-21 ASSESSMENT — PATIENT HEALTH QUESTIONNAIRE - PHQ9
2. FEELING DOWN, DEPRESSED OR HOPELESS: NOT AT ALL
SUM OF ALL RESPONSES TO PHQ9 QUESTIONS 1 AND 2: 0
1. LITTLE INTEREST OR PLEASURE IN DOING THINGS: NOT AT ALL

## 2025-08-21 ASSESSMENT — ENCOUNTER SYMPTOMS
OCCASIONAL FEELINGS OF UNSTEADINESS: 0
LOSS OF SENSATION IN FEET: 0
DEPRESSION: 0

## 2025-08-28 DIAGNOSIS — E11.9 TYPE 2 DIABETES MELLITUS WITHOUT COMPLICATION, WITHOUT LONG-TERM CURRENT USE OF INSULIN: ICD-10-CM

## 2025-08-28 DIAGNOSIS — Z12.5 SCREENING PSA (PROSTATE SPECIFIC ANTIGEN): ICD-10-CM

## 2025-08-28 DIAGNOSIS — E78.00 PURE HYPERCHOLESTEROLEMIA: ICD-10-CM

## 2025-08-28 DIAGNOSIS — I10 BENIGN ESSENTIAL HTN: ICD-10-CM

## 2025-09-04 ENCOUNTER — HOSPITAL ENCOUNTER (OUTPATIENT)
Dept: RADIOLOGY | Facility: HOSPITAL | Age: 65
Discharge: HOME | End: 2025-09-04
Payer: MEDICARE

## 2025-09-04 DIAGNOSIS — R91.8 MULTIPLE LUNG NODULES: ICD-10-CM

## 2025-09-04 DIAGNOSIS — F17.211 CIGARETTE NICOTINE DEPENDENCE IN REMISSION: ICD-10-CM

## 2025-09-04 PROCEDURE — 71271 CT THORAX LUNG CANCER SCR C-: CPT

## 2025-11-24 ENCOUNTER — APPOINTMENT (OUTPATIENT)
Dept: PRIMARY CARE | Facility: CLINIC | Age: 65
End: 2025-11-24
Payer: MEDICARE